# Patient Record
Sex: FEMALE | Race: WHITE | NOT HISPANIC OR LATINO | URBAN - METROPOLITAN AREA
[De-identification: names, ages, dates, MRNs, and addresses within clinical notes are randomized per-mention and may not be internally consistent; named-entity substitution may affect disease eponyms.]

---

## 2017-08-10 ENCOUNTER — OFFICE VISIT (OUTPATIENT)
Dept: MEDICAL GROUP | Facility: PHYSICIAN GROUP | Age: 72
End: 2017-08-10
Payer: MEDICARE

## 2017-08-10 VITALS
HEART RATE: 79 BPM | SYSTOLIC BLOOD PRESSURE: 115 MMHG | DIASTOLIC BLOOD PRESSURE: 70 MMHG | RESPIRATION RATE: 16 BRPM | TEMPERATURE: 97.5 F | OXYGEN SATURATION: 99 %

## 2017-08-10 DIAGNOSIS — Z99.3 WHEELCHAIR BOUND: ICD-10-CM

## 2017-08-10 DIAGNOSIS — I10 BENIGN ESSENTIAL HTN: ICD-10-CM

## 2017-08-10 DIAGNOSIS — Z78.0 POST-MENOPAUSAL: ICD-10-CM

## 2017-08-10 DIAGNOSIS — Z00.00 WELL ADULT EXAM: ICD-10-CM

## 2017-08-10 DIAGNOSIS — F41.9 ANXIETY: ICD-10-CM

## 2017-08-10 DIAGNOSIS — I89.0 LYMPHEDEMA: ICD-10-CM

## 2017-08-10 DIAGNOSIS — E78.2 MIXED HYPERLIPIDEMIA: ICD-10-CM

## 2017-08-10 DIAGNOSIS — M19.90 OSTEOARTHRITIS, UNSPECIFIED OSTEOARTHRITIS TYPE, UNSPECIFIED SITE: ICD-10-CM

## 2017-08-10 DIAGNOSIS — Z12.31 ENCOUNTER FOR SCREENING MAMMOGRAM FOR HIGH-RISK PATIENT: ICD-10-CM

## 2017-08-10 DIAGNOSIS — F11.90 OPIATE USE: ICD-10-CM

## 2017-08-10 PROCEDURE — 99204 OFFICE O/P NEW MOD 45 MIN: CPT | Performed by: FAMILY MEDICINE

## 2017-08-10 RX ORDER — FUROSEMIDE 20 MG/1
20 TABLET ORAL DAILY
Qty: 30 TAB | Refills: 3 | Status: SHIPPED | OUTPATIENT
Start: 2017-08-10 | End: 2017-12-10 | Stop reason: SDUPTHER

## 2017-08-10 RX ORDER — FLUCONAZOLE 100 MG/1
100 TABLET ORAL DAILY
COMMUNITY

## 2017-08-10 RX ORDER — BUSPIRONE HYDROCHLORIDE 15 MG/1
15 TABLET ORAL 2 TIMES DAILY
COMMUNITY

## 2017-08-10 RX ORDER — METOPROLOL SUCCINATE 25 MG/1
25 TABLET, EXTENDED RELEASE ORAL DAILY
COMMUNITY
End: 2017-09-08 | Stop reason: SDUPTHER

## 2017-08-10 RX ORDER — AMLODIPINE BESYLATE 5 MG/1
5 TABLET ORAL DAILY
COMMUNITY
End: 2017-09-08 | Stop reason: SDUPTHER

## 2017-08-10 RX ORDER — PAROXETINE HYDROCHLORIDE 20 MG/1
20 TABLET, FILM COATED ORAL DAILY
COMMUNITY
End: 2019-11-11 | Stop reason: SDUPTHER

## 2017-08-10 RX ORDER — MIRTAZAPINE 30 MG/1
30 TABLET, FILM COATED ORAL NIGHTLY
COMMUNITY

## 2017-08-10 RX ORDER — ALPRAZOLAM 0.5 MG/1
0.5 TABLET ORAL NIGHTLY PRN
COMMUNITY

## 2017-08-10 RX ORDER — HYDRALAZINE HYDROCHLORIDE 25 MG/1
25 TABLET, FILM COATED ORAL 3 TIMES DAILY
COMMUNITY

## 2017-08-10 ASSESSMENT — ENCOUNTER SYMPTOMS
DIZZINESS: 0
HEADACHES: 0
PALPITATIONS: 0
FEVER: 0
PSYCHIATRIC NEGATIVE: 1
ANOREXIA: 0
COUGH: 0
NEUROLOGICAL NEGATIVE: 1
RESPIRATORY NEGATIVE: 1
NECK PAIN: 0
MYALGIAS: 1
ABDOMINAL PAIN: 0
CHILLS: 0
LEG SWELLING: 1
ARTHRALGIAS: 1
EYES NEGATIVE: 1
GASTROINTESTINAL NEGATIVE: 1
CONSTITUTIONAL NEGATIVE: 1
CONSTIPATION: 0
BACK PAIN: 1
HEMOPTYSIS: 0

## 2017-08-10 ASSESSMENT — PATIENT HEALTH QUESTIONNAIRE - PHQ9
5. POOR APPETITE OR OVEREATING: 1 - SEVERAL DAYS
CLINICAL INTERPRETATION OF PHQ2 SCORE: 1
SUM OF ALL RESPONSES TO PHQ QUESTIONS 1-9: 6

## 2017-08-10 NOTE — MR AVS SNAPSHOT
Claudia Brumfield   8/10/2017 2:00 PM   Office Visit   MRN: [id number]    [facility]  [facility]   Dept Phone:  [phone]    Description:  Female : 1945   Provider:  Vinayak Forman M.D.           Reason for Visit     Establish Care     Referral Needed           Allergies as of 8/10/2017     No Known Allergies      You were diagnosed with     Lymphedema   [909491]       Well adult exam   [506342]       Osteoarthritis, unspecified osteoarthritis type, unspecified site   [8755912]       Wheelchair bound   [781170]       Benign essential HTN   [533993]       Mixed hyperlipidemia   [272.2.ICD-9-CM]       Encounter for screening mammogram for high-risk patient   [0947031]       Post-menopausal   [087548]       Opiate use   [711033]       Anxiety   [735571]         Vital Signs     Blood Pressure Pulse Temperature Respirations          115/70 mmHg 79 36.4 °C (97.5 °F) 16      Oxygen Saturation Smoking Status                99% Current Every Day Smoker          Basic Information     Date Of Birth Sex Race Ethnicity Preferred Language    1945 Female White Non- English      Your appointments     Sep 12, 2017  1:30 PM   Established Patient with Vinayak Forman M.D.   [facility] (--)    [address] [id number] [phone]           You will be receiving a confirmation call a few days before your appointment from our automated call confirmation system.              Problem List              ICD-10-CM Priority Class Noted - Resolved    Osteoarthritis M19.90   Unknown - Present    Wheelchair bound Z99.3   Unknown - Present    Lymphedema I89.0   8/10/2017 - Present    Benign essential HTN I10   8/10/2017 - Present    Mixed hyperlipidemia E78.2   8/10/2017 - Present    Post-menopausal Z78.0   8/10/2017 - Present    Opiate use F11.90   8/10/2017 - Present    Anxiety F41.9   8/10/2017 - Present      Health Maintenance        Date Due  Completion Dates    IMM DTaP/Tdap/Td Vaccine (1 - Tdap) 12/9/1965 ---    PAP SMEAR 12/9/1967 ---    MAMMOGRAM 12/9/1986 ---    COLONOSCOPY 12/9/1996 ---    IMM ZOSTER VACCINE 12/9/2006 ---    BONE DENSITY 12/9/2011 ---    IMM PNEUMOCOCCAL 65+ (ADULT) LOW/MEDIUM RISK SERIES (1 of 2 - PCV13) 12/9/2011 ---    IMM INFLUENZA (1) 9/1/2017 ---            Current Immunizations     No immunizations on file.      Below and/or attached are the medications your provider expects you to take. Review all of your home medications and newly ordered medications with your provider and/or pharmacist. Follow medication instructions as directed by your provider and/or pharmacist. Please keep your medication list with you and share with your provider. Update the information when medications are discontinued, doses are changed, or new medications (including over-the-counter products) are added; and carry medication information at all times in the event of emergency situations     Allergies:  No Known Allergies          Medications  Valid as of: August 10, 2017 -  5:56 PM    Generic Name Brand Name Tablet Size Instructions for use    ALPRAZolam (Tab) XANAX 0.5 MG Take 0.5 mg by mouth at bedtime as needed for Sleep.        AmLODIPine Besylate (Tab) NORVASC 5 MG Take 5 mg by mouth every day.        BuPROPion HCl (Tab) WELLBUTRIN 100 MG Take 100 mg by mouth 2 times a day.        BusPIRone HCl (Tab) BUSPAR 15 MG Take 15 mg by mouth 2 times a day.        DiazePAM (Tab) VALIUM 5 MG Take 5 mg by mouth every 6 hours as needed for Anxiety.        Fluconazole (Tab) DIFLUCAN 100 MG Take 100 mg by mouth every day.        Furosemide (Tab) LASIX 20 MG Take 1 Tab by mouth every day.        HydrALAZINE HCl (Tab) APRESOLINE 25 MG Take 25 mg by mouth 3 times a day.        Hydrocodone-Acetaminophen (Tab) NORCO  MG Take 1-2 Tabs by mouth every 6 hours as needed.        Meloxicam (Tab) MOBIC 15 MG Take 15 mg by mouth every day.        Metoprolol Succinate  (TABLET SR 24 HR) TOPROL XL 25 MG Take 25 mg by mouth every day.        Mirtazapine (Tab) REMERON 30 MG Take 30 mg by mouth every evening.        Morphine Sulfate (Tab) MS IR 30 MG Take 30 mg by mouth every four hours as needed for Mild Pain.        PARoxetine HCl (Tab) PAXIL 20 MG Take 20 mg by mouth every day.        Pravastatin Sodium (Tab) PRAVACHOL 20 MG Take 20 mg by mouth every evening.        Promethazine HCl (Tab) PHENERGAN 25 MG Take 25 mg by mouth every 6 hours as needed for Nausea/Vomiting.        Sertraline HCl (Tab) ZOLOFT 100 MG Take 100 mg by mouth every day.        SUMAtriptan Succinate (Tab) IMITREX 25 MG Take  mg by mouth Once PRN.        Tolterodine Tartrate (CAPSULE SR 24 HR) DETROL LA 4 MG Take 4 mg by mouth every day.        .                 Medicines prescribed today were sent to:     [facility] [address] [place] [address] [place]    [address] [place] [id number]    Phone: [phone] Fax: [phone]    Open 24 Hours?: No      Medication refill instructions:       If your prescription bottle indicates you have medication refills left, it is not necessary to call your provider’s office. Please contact your pharmacy and they will refill your medication.    If your prescription bottle indicates you do not have any refills left, you may request refills at any time through one of the following ways: The online [employer]al[employer] system (except Urgent Care), by calling your provider’s office, or by asking your pharmacy to contact your provider’s office with a refill request. Medication refills are processed only during regular business hours and may not be available until the next business day. Your provider may request additional information or to have a follow-up visit with you prior to refilling your medication.   *Please Note: Medication refills are assigned a new Rx number when refilled electronically. Your pharmacy may indicate that  no refills were authorized even though a new prescription for the same medication is available at the pharmacy. Please request the medicine by name with the pharmacy before contacting your provider for a refill.        Your To Do List     Future Labs/Procedures Complete By Expires    CBC WITHOUT DIFFERENTIAL  As directed 2/10/2018    COMP METABOLIC PANEL  As directed 8/10/2018    FREE THYROXINE  As directed 8/10/2018    LIPID PROFILE  As directed 8/10/2018    MA-SCREENING DIGITAL MAMMO  As directed 8/10/2018    OCCULT BLOOD FECES IMMUNOASSAY  As directed 8/10/2018    TRIIDOTHYRONINE  As directed 8/10/2018    TSH  As directed 8/10/2018    VITAMIN D,25 HYDROXY  As directed 8/10/2018      Referral     A referral request has been sent to our patient care coordination department. Please allow 3-5 business days for us to process this request and contact you either by phone or mail. If you do not hear from us by the 5th business day, please call us at [phone].           Sendmail Access Code: [email or web address]  Expires: 9/9/2017  5:56 PM    Your email address is not on file at [facility].  Email Addresses are required for you to sign up for Sendmail, please contact [phone] to verify your personal information and to provide your email address prior to attempting to register for Sendmail.    Claudia Brumfield  [address] [place] [address]    Sendmail  A secure, online tool to manage your health information     [facility]’s Sendmail® is a secure, online tool that connects you to your personalized health information from the privacy of your home -- day or night - making it very easy for you to manage your healthcare. Once the activation process is completed, you can even access your medical information using the Sendmail martha, which is available for free in the Apple Martha store or Google Play store.     To learn more about Sendmail, visit [email or web address]    There are two levels of access available (as  shown below):   My Chart Features  [facility] Doctor [facility]  Urgent  Care Non-[facility] Primary Care Doctor   Email your healthcare team securely and privately 24/7 X X X    Manage appointments: schedule your next appointment; view details of past/upcoming appointments X      Request prescription refills. X      View recent personal medical records, including lab and immunizations X X X X   View health record, including health history, allergies, medications X X X X   Read reports about your outpatient visits, procedures, consult and ER notes X X X X   See your discharge summary, which is a recap of your hospital and/or ER visit that includes your diagnosis, lab results, and care plan X X  X     How to register for NGenTec:  Once your e-mail address has been verified, follow the following steps to sign up for NGenTec.     1. Go to  [email or web address]  2. Click on the Sign Up Now box, which takes you to the New Member Sign Up page. You will need to provide the following information:  a. Enter your NGenTec Access Code exactly as it appears at the top of this page. (You will not need to use this code after you’ve completed the sign-up process. If you do not sign up before the expiration date, you must request a new code.)   b. Enter your date of birth.   c. Enter your home email address.   d. Click Submit, and follow the next screen’s instructions.  3. Create a NGenTec ID. This will be your NGenTec login ID and cannot be changed, so think of one that is secure and easy to remember.  4. Create a NGenTec password. You can change your password at any time.  5. Enter your Password Reset Question and Answer. This can be used at a later time if you forget your password.   6. Enter your e-mail address. This allows you to receive e-mail notifications when new information is available in NGenTec.  7. Click Sign Up. You can now view your health information.    For assistance activating your  [facility] account, call [phone]         Quit Tobacco Information     Do you want to quit using tobacco?    Quitting tobacco decreases risks of cancer, heart and lung disease, increases life expectancy, improves sense of taste and smell, and increases spending money, among other benefits.    If you are thinking about quitting, we can help.  • [facility] Quit Tobacco Program: [phone]  o Program occurs weekly for four weeks and includes pharmacist consultation on products to support quitting smoking or chewing tobacco. A provider referral is needed for pharmacist consultation.  • Tobacco Users Help Hotline: 3-800-QUIT-NOW (471-9469) or https://[place].quitlogix.org/  o Free, confidential telephone and online coaching for [place] residents. Sessions are designed on a schedule that is convenient for you. Eligible clients receive free nicotine replacement therapy.  • Nationally: www.smokefree.gov  o Information and professional assistance to support both immediate and long-term needs as you become, and remain, a non-smoker. Smokefree.gov allows you to choose the help that best fits your needs.

## 2017-08-10 NOTE — Clinical Note
"August 10, 2017        Claudia Brumfield,     Welcome to [employer].     You can send messages, schedule appointments, and request medication refills by sending your healthcare provider a \"Non-Urgent Medical Question.\" To login you may go to [employer]  or you on your Smartphone by downloading the [employer] martha (available in the Apple Martha store (iKnowl) or Google Play store).    Username: [email or web address]        Password: [email or web address]    SECURITY QUESTION   Randy    If you have any questions/concerns please do not hesitate to contact me at your earliest convenience @ [phone].     Eloy Roberts, Med Ass't                          Eloy Roberts    "

## 2017-08-10 NOTE — PROGRESS NOTES
Subjective:      Claudia Brumfield is a 71 y.o. female who presents with Establish Care and Referral Needed            HPI Comments: Patient is a 70 y/o who is wc bound due to OA and has a long history of chronic lymphedema in both legs  Was getting wound care and wrapping from  and needs a new referral for this  Will also start on low dose lasix and check labs and HM  1. Lymphedema    - hydrALAZINE (APRESOLINE) 25 MG Tab; Take 25 mg by mouth 3 times a day.  - alprazolam (XANAX) 0.5 MG Tab; Take 0.5 mg by mouth at bedtime as needed for Sleep.  - busPIRone (BUSPAR) 15 MG tablet; Take 15 mg by mouth 2 times a day.  - amlodipine (NORVASC) 5 MG Tab; Take 5 mg by mouth every day.  - metoprolol SR (TOPROL XL) 25 MG TABLET SR 24 HR; Take 25 mg by mouth every day.  - paroxetine (PAXIL) 20 MG Tab; Take 20 mg by mouth every day.  - mirtazapine (REMERON) 30 MG Tab tablet; Take 30 mg by mouth every evening.   - fluconazole (DIFLUCAN) 100 MG Tab; Take 100 mg by mouth every day.  - REFERRAL TO HOME HEALTH    2. Well adult exam    - OCCULT BLOOD FECES IMMUNOASSAY; Future    3. Osteoarthritis, unspecified osteoarthritis type, unspecified site  Per pain clinic for meds    4. Wheelchair bound      5. Benign essential HTN  Currently treated for HTN, taking meds with no CP or sob, monitors bp at home periodically. controlled        6. Mixed hyperlipidemia  Currently treated for HLD, taking meds with no new myalgias or joint pain, watching fats in diet  controlled        7. Encounter for screening mammogram for high-risk patient    - MA-SCREENING DIGITAL MAMMO; Future    8. Post-menopausal    - DS-BONE DENSITY STUDY (DEXA)    Past Medical History:    Fibromyalgia                                                  Migraine                                                      DJD (degenerative joint disease)                              Chronic fatigue                                               Lymphedema                                                     Osteoarthritis                                                  Comment:wc bound due to pain in back and hips    Wheelchair bound                                              Hyperlipidemia                                                Hypertension                                                  Encounter for long-term (current) use of other*                 Comment:surya    Depression                                                    Anxiety                                                     Past Surgical History:    OTHER ORTHOPEDIC SURGERY                                         Comment:neck    OTHER ABDOMINAL SURGERY                                        CHOLECYSTECTOMY                                                COLON RESECTION                                                TONSILLECTOMY                                                  HERNIA REPAIR                                                  Smoking Status: Current Every Day Smoker        Packs/Day: 0.50  Years:          Alcohol Use: No              History reviewed.  No pertinent family history.      Current outpatient prescriptions: •  hydrALAZINE (APRESOLINE) 25 MG Tab, Take 25 mg by mouth 3 times a day., Disp: , Rfl: •  alprazolam (XANAX) 0.5 MG Tab, Take 0.5 mg by mouth at bedtime as needed for Sleep., Disp: , Rfl: •  busPIRone (BUSPAR) 15 MG tablet, Take 15 mg by mouth 2 times a day., Disp: , Rfl: •  amlodipine (NORVASC) 5 MG Tab, Take 5 mg by mouth every day., Disp: , Rfl: •  metoprolol SR (TOPROL XL) 25 MG TABLET SR 24 HR, Take 25 mg by mouth every day., Disp: , Rfl: •  paroxetine (PAXIL) 20 MG Tab, Take 20 mg by mouth every day., Disp: , Rfl: •  mirtazapine (REMERON) 30 MG Tab tablet, Take 30 mg by mouth every evening., Disp: , Rfl: •  fluconazole (DIFLUCAN) 100 MG Tab, Take 100 mg by mouth every day., Disp: , Rfl: •  hydrocodone/acetaminophen (NORCO)  MG TABS, Take 1-2 Tabs by mouth every 6 hours as needed.,  Disp: , Rfl: •  sumatriptan (IMITREX) 25 MG TABS, Take  mg by mouth Once PRN., Disp: , Rfl: •  MORPHINE SULFATE 30 MG PO TABS, Take 30 mg by mouth every four hours as needed for Mild Pain., Disp: , Rfl: •  MELOXICAM 15 MG PO TABS, Take 15 mg by mouth every day., Disp: , Rfl: •  buPROPion (WELLBUTRIN) 100 MG TABS, Take 100 mg by mouth 2 times a day., Disp: , Rfl: •  tolterodine ER (DETROL LA) 4 MG CP24, Take 4 mg by mouth every day., Disp: , Rfl: •  pravastatin (PRAVACHOL) 20 MG TABS, Take 20 mg by mouth every evening., Disp: , Rfl: •  PROMETHAZINE HCL 25 MG PO TABS, Take 25 mg by mouth every 6 hours as needed for Nausea/Vomiting., Disp: , Rfl: •  DIAZEPAM 5 MG PO TABS, Take 5 mg by mouth every 6 hours as needed for Anxiety., Disp: , Rfl: •  SERTRALINE  MG PO TABS, Take 100 mg by mouth every day., Disp: , Rfl:         Leg Swelling  This is a chronic problem. The current episode started more than 1 year ago. The problem occurs constantly. The problem has been gradually worsening. Associated symptoms include arthralgias and myalgias. Pertinent negatives include no abdominal pain, anorexia, chest pain, chills, coughing, fever, headaches, neck pain or rash. She has tried rest for the symptoms. The treatment provided no relief.       Review of Systems   Constitutional: Negative.  Negative for fever and chills.        Past Medical History:    Fibromyalgia                                                  Migraine                                                      DJD (degenerative joint disease)                              Chronic fatigue                                               Lymphedema                                                    Osteoarthritis                                                  Comment:wc bound due to pain in back and hips    Wheelchair bound                                              Hyperlipidemia                                                Hypertension                                                   Encounter for long-term (current) use of other*                 Comment:surya    Depression                                                    Anxiety                                                     Past Surgical History:    OTHER ORTHOPEDIC SURGERY                                         Comment:neck    OTHER ABDOMINAL SURGERY                                        CHOLECYSTECTOMY                                                COLON RESECTION                                                TONSILLECTOMY                                                  HERNIA REPAIR                                                  Smoking Status: Current Every Day Smoker        Packs/Day: 0.50  Years:         Alcohol Use: No              History reviewed.  No pertinent family history.     HENT: Negative.    Eyes: Negative.    Respiratory: Negative.  Negative for cough and hemoptysis.    Cardiovascular: Positive for leg swelling. Negative for chest pain and palpitations.   Gastrointestinal: Negative.  Negative for abdominal pain, constipation and anorexia.   Genitourinary: Negative.  Negative for dysuria and urgency.   Musculoskeletal: Positive for myalgias, back pain, joint pain and arthralgias. Negative for neck pain.   Skin: Negative.  Negative for rash.   Neurological: Negative.  Negative for dizziness and headaches.   Endo/Heme/Allergies: Negative.    Psychiatric/Behavioral: Negative.  Negative for suicidal ideas.          Objective:     /70 mmHg  Pulse 79  Temp(Src) 36.4 °C (97.5 °F)  Resp 16  Ht   Wt   SpO2 99%     Physical Exam   Constitutional: She is oriented to person, place, and time. No distress.   HENT:   Head: Normocephalic and atraumatic.   Right Ear: External ear normal.   Left Ear: External ear normal.   Nose: Nose normal.   Mouth/Throat: Oropharynx is clear and moist. No oropharyngeal exudate.   Eyes: Pupils are equal, round, and reactive to light. Right eye exhibits  no discharge. Left eye exhibits no discharge. No scleral icterus.   Neck: Normal range of motion. Neck supple. No JVD present. No tracheal deviation present. No thyromegaly present.   Cardiovascular: Normal rate, regular rhythm, normal heart sounds and intact distal pulses.  Exam reveals no gallop and no friction rub.    No murmur heard.  Pulmonary/Chest: Effort normal and breath sounds normal. No stridor. No respiratory distress. She has no wheezes. She has no rales. She exhibits no tenderness.   Abdominal: Soft. She exhibits no distension. There is no tenderness.   Lymphadenopathy:     She has no cervical adenopathy.   Neurological: She is alert and oriented to person, place, and time.   Skin: Skin is warm and dry. She is not diaphoretic.   Chronic lymphedema in both legs with venous stasis  Swelling in both sides with brawny edema and some clear leakage at spots but no induration or obvious current infection  Will treat with venous stasis wraps   Psychiatric: Judgment normal.   Nursing note and vitals reviewed.              Assessment/Plan:     1. Lymphedema    - hydrALAZINE (APRESOLINE) 25 MG Tab; Take 25 mg by mouth 3 times a day.  - alprazolam (XANAX) 0.5 MG Tab; Take 0.5 mg by mouth at bedtime as needed for Sleep.  - busPIRone (BUSPAR) 15 MG tablet; Take 15 mg by mouth 2 times a day.  - amlodipine (NORVASC) 5 MG Tab; Take 5 mg by mouth every day.  - metoprolol SR (TOPROL XL) 25 MG TABLET SR 24 HR; Take 25 mg by mouth every day.  - paroxetine (PAXIL) 20 MG Tab; Take 20 mg by mouth every day.  - mirtazapine (REMERON) 30 MG Tab tablet; Take 30 mg by mouth every evening.  - fluconazole (DIFLUCAN) 100 MG Tab; Take 100 mg by mouth every day.  - REFERRAL TO HOME HEALTH    2. Well adult exam    - OCCULT BLOOD FECES IMMUNOASSAY; Future    3. Osteoarthritis, unspecified osteoarthritis type, unspecified site      4. Wheelchair bound      5. Benign essential HTN      6. Mixed hyperlipidemia      7. Encounter for  screening mammogram for high-risk patient    - MA-SCREENING DIGITAL MAMMO; Future    8. Post-menopausal    - DS-BONE DENSITY STUDY (DEXA)

## 2017-08-22 ENCOUNTER — TELEPHONE (OUTPATIENT)
Dept: MEDICAL GROUP | Facility: PHYSICIAN GROUP | Age: 72
End: 2017-08-22

## 2017-08-22 NOTE — TELEPHONE ENCOUNTER
Patent just stared taking lasix and she is swelling a lot more and not urinating as much as normal.

## 2017-08-23 NOTE — TELEPHONE ENCOUNTER
Tried to call patient but her number was disconnected so i tried calling her emergency contact Brenda and left a message with her too call us back or too have the patient call us back.

## 2017-08-25 DIAGNOSIS — E87.6 HYPOKALEMIA: ICD-10-CM

## 2017-08-28 RX ORDER — POTASSIUM CHLORIDE 750 MG/1
10 TABLET, FILM COATED, EXTENDED RELEASE ORAL 2 TIMES DAILY
Qty: 60 TAB | Refills: 3 | Status: SHIPPED | OUTPATIENT
Start: 2017-08-28 | End: 2017-12-26 | Stop reason: SDUPTHER

## 2017-08-29 ENCOUNTER — TELEPHONE (OUTPATIENT)
Dept: MEDICAL GROUP | Facility: PHYSICIAN GROUP | Age: 72
End: 2017-08-29

## 2017-08-29 NOTE — TELEPHONE ENCOUNTER
----- Message from Vinayak Forman M.D. sent at 8/28/2017  9:42 AM PDT -----  Low on potassium, a rx for this was called in for her and then needs to repeat this lab one week before we see her in september

## 2017-08-29 NOTE — TELEPHONE ENCOUNTER
Tried to call patient but phone was disconnected. No other number listed in patients chart. Patient comes in next month so we can change  the numbers then and giver her the results.

## 2017-08-31 ENCOUNTER — TELEPHONE (OUTPATIENT)
Dept: MEDICAL GROUP | Facility: PHYSICIAN GROUP | Age: 72
End: 2017-08-31

## 2017-08-31 NOTE — TELEPHONE ENCOUNTER
Phone Number Called: [phone]    Message: LFT Vm FOR PT TO CALL US BACK SHE NEEDS TO RE DO URINE TEST     Left Message for patient to call back: no

## 2017-08-31 NOTE — TELEPHONE ENCOUNTER
----- Message from Vinayak Forman M.D. sent at 8/31/2017  2:53 PM PDT -----  Urine test was contaminated, please have come into the office for a visit and repeat

## 2017-09-07 ENCOUNTER — TELEPHONE (OUTPATIENT)
Dept: MEDICAL GROUP | Facility: PHYSICIAN GROUP | Age: 72
End: 2017-09-07

## 2017-09-07 NOTE — TELEPHONE ENCOUNTER
Patients home health nurse would like to get  A  culture order. She has burning and frequent urination since her hospital visit and she will be coming in to [place] on 9/12.

## 2017-09-08 DIAGNOSIS — Z78.0 POST-MENOPAUSAL: ICD-10-CM

## 2017-09-08 DIAGNOSIS — I89.0 LYMPHEDEMA: ICD-10-CM

## 2017-09-08 DIAGNOSIS — M19.90 OSTEOARTHRITIS, UNSPECIFIED OSTEOARTHRITIS TYPE, UNSPECIFIED SITE: ICD-10-CM

## 2017-09-08 DIAGNOSIS — E78.2 MIXED HYPERLIPIDEMIA: ICD-10-CM

## 2017-09-08 DIAGNOSIS — Z99.3 WHEELCHAIR BOUND: ICD-10-CM

## 2017-09-08 DIAGNOSIS — Z00.00 WELL ADULT EXAM: ICD-10-CM

## 2017-09-08 DIAGNOSIS — Z12.31 ENCOUNTER FOR SCREENING MAMMOGRAM FOR HIGH-RISK PATIENT: ICD-10-CM

## 2017-09-08 DIAGNOSIS — I10 BENIGN ESSENTIAL HTN: ICD-10-CM

## 2017-09-08 NOTE — TELEPHONE ENCOUNTER
Called and left a message with Yadira at [facility] informed her of message below.

## 2017-09-08 NOTE — TELEPHONE ENCOUNTER
Was the patient seen in the last year in this department? Yes     Does patient have an active prescription for medications requested? Yes     Received Request Via: Patient     Last office visit 08/10/17

## 2017-09-11 RX ORDER — METOPROLOL SUCCINATE 25 MG/1
25 TABLET, EXTENDED RELEASE ORAL DAILY
Qty: 30 TAB | Refills: 0 | Status: SHIPPED | OUTPATIENT
Start: 2017-09-11 | End: 2017-10-09 | Stop reason: SDUPTHER

## 2017-09-11 RX ORDER — AMLODIPINE BESYLATE 5 MG/1
5 TABLET ORAL DAILY
Qty: 30 TAB | Refills: 0 | Status: SHIPPED | OUTPATIENT
Start: 2017-09-11 | End: 2017-09-12 | Stop reason: SDUPTHER

## 2017-09-12 ENCOUNTER — OFFICE VISIT (OUTPATIENT)
Dept: MEDICAL GROUP | Facility: PHYSICIAN GROUP | Age: 72
End: 2017-09-12
Payer: MEDICARE

## 2017-09-12 VITALS
TEMPERATURE: 97.9 F | HEART RATE: 107 BPM | SYSTOLIC BLOOD PRESSURE: 130 MMHG | OXYGEN SATURATION: 96 % | DIASTOLIC BLOOD PRESSURE: 80 MMHG | RESPIRATION RATE: 16 BRPM

## 2017-09-12 DIAGNOSIS — F11.90 OPIATE USE: ICD-10-CM

## 2017-09-12 DIAGNOSIS — M19.90 OSTEOARTHRITIS, UNSPECIFIED OSTEOARTHRITIS TYPE, UNSPECIFIED SITE: ICD-10-CM

## 2017-09-12 DIAGNOSIS — Z12.31 ENCOUNTER FOR SCREENING MAMMOGRAM FOR HIGH-RISK PATIENT: ICD-10-CM

## 2017-09-12 DIAGNOSIS — I10 BENIGN ESSENTIAL HTN: ICD-10-CM

## 2017-09-12 DIAGNOSIS — Z00.00 WELL ADULT EXAM: ICD-10-CM

## 2017-09-12 DIAGNOSIS — E78.2 MIXED HYPERLIPIDEMIA: ICD-10-CM

## 2017-09-12 DIAGNOSIS — N30.01 ACUTE CYSTITIS WITH HEMATURIA: ICD-10-CM

## 2017-09-12 DIAGNOSIS — I89.0 LYMPHEDEMA: ICD-10-CM

## 2017-09-12 DIAGNOSIS — Z78.0 POST-MENOPAUSAL: ICD-10-CM

## 2017-09-12 DIAGNOSIS — I87.8 VENOUS STASIS: ICD-10-CM

## 2017-09-12 DIAGNOSIS — Z99.3 WHEELCHAIR BOUND: ICD-10-CM

## 2017-09-12 PROCEDURE — 99214 OFFICE O/P EST MOD 30 MIN: CPT | Performed by: FAMILY MEDICINE

## 2017-09-12 RX ORDER — SULFAMETHOXAZOLE AND TRIMETHOPRIM 800; 160 MG/1; MG/1
1 TABLET ORAL 2 TIMES DAILY
Qty: 20 TAB | Refills: 0 | Status: SHIPPED | OUTPATIENT
Start: 2017-09-12

## 2017-09-12 RX ORDER — AMLODIPINE BESYLATE 5 MG/1
5 TABLET ORAL DAILY
Qty: 30 TAB | Refills: 6 | Status: SHIPPED | OUTPATIENT
Start: 2017-09-12 | End: 2020-10-16 | Stop reason: SDUPTHER

## 2017-09-12 ASSESSMENT — ENCOUNTER SYMPTOMS
CONSTITUTIONAL NEGATIVE: 1
CHILLS: 0
PALPITATIONS: 0
NECK PAIN: 1
EYES NEGATIVE: 1
GASTROINTESTINAL NEGATIVE: 1
PSYCHIATRIC NEGATIVE: 1
DIZZINESS: 0
COUGH: 0
CONSTIPATION: 0
NEUROLOGICAL NEGATIVE: 1
HEMOPTYSIS: 0
HEADACHES: 0
MYALGIAS: 1
BACK PAIN: 1
RESPIRATORY NEGATIVE: 1
CARDIOVASCULAR NEGATIVE: 1
FEVER: 0

## 2017-09-12 NOTE — PROGRESS NOTES
Subjective:      Claudia Brumfield is a 71 y.o. female who presents with UTI            1. Acute cystitis with hematuria  New onset of frequency and dysuria    - sulfamethoxazole-trimethoprim (BACTRIM DS) 800-160 MG tablet; Take 1 Tab by mouth 2 times a day.  Dispense: 20 Tab; Refill: 0    2. Wheelchair bound  Had recent fall at home and resolving left forehead ecchymosis, went to er and neg ct of head and face, had life alert now    3. Opiate use  Per pain clinic    4. Venous stasis  Using compression wraps per wound clinic    Past Medical History:  No date: Anxiety  No date: Chronic fatigue  No date: Depression  No date: DJD (degenerative joint disease)  No date: Encounter for long-term (current) use of other*      Comment: surya  No date: Fibromyalgia  No date: Hyperlipidemia  No date: Hypertension  No date: Lymphedema  No date: Migraine  No date: Osteoarthritis      Comment: wc bound due to pain in back and hips  No date: Wheelchair bound  Past Surgical History:  No date: CHOLECYSTECTOMY  No date: COLON RESECTION  No date: HERNIA REPAIR  No date: OTHER ABDOMINAL SURGERY  No date: OTHER ORTHOPEDIC SURGERY      Comment: neck  No date: TONSILLECTOMY  Smoking status: Current Every Day Smoker                                                   Packs/day: 0.50      Years: 0.00      Smokeless tobacco: Never Used                      Alcohol use: No              No family history on file.      Current Outpatient Prescriptions: •  sulfamethoxazole-trimethoprim (BACTRIM DS) 800-160 MG tablet, Take 1 Tab by mouth 2 times a day., Disp: 20 Tab, Rfl: 0•  amlodipine (NORVASC) 5 MG Tab, Take 1 Tab by mouth every day., Disp: 30 Tab, Rfl: 0•  metoprolol SR (TOPROL XL) 25 MG TABLET SR 24 HR, Take 1 Tab by mouth every day., Disp: 30 Tab, Rfl: 0•  potassium chloride ER (KLOR-CON) 10 MEQ tablet, Take 1 Tab by mouth 2 times a day., Disp: 60 Tab, Rfl: 3•  hydrALAZINE (APRESOLINE) 25 MG Tab, Take 25 mg by mouth 3 times a day., Disp: , Rfl:  •  alprazolam (XANAX) 0.5 MG Tab, Take 0.5 mg by mouth at bedtime as needed for Sleep., Disp: , Rfl: •  busPIRone (BUSPAR) 15 MG tablet, Take 15 mg by mouth 2 times a day., Disp: , Rfl: •  paroxetine (PAXIL) 20 MG Tab, Take 20 mg by mouth every day., Disp: , Rfl: •  mirtazapine (REMERON) 30 MG Tab tablet, Take 30 mg by mouth every evening., Disp: , Rfl: •  fluconazole (DIFLUCAN) 100 MG Tab, Take 100 mg by mouth every day., Disp: , Rfl: •  furosemide (LASIX) 20 MG Tab, Take 1 Tab by mouth every day., Disp: 30 Tab, Rfl: 3•  buPROPion (WELLBUTRIN) 100 MG TABS, Take 100 mg by mouth 2 times a day., Disp: , Rfl: •  tolterodine ER (DETROL LA) 4 MG CP24, Take 4 mg by mouth every day., Disp: , Rfl: •  hydrocodone/acetaminophen (NORCO)  MG TABS, Take 1-2 Tabs by mouth every 6 hours as needed., Disp: , Rfl: •  sumatriptan (IMITREX) 25 MG TABS, Take  mg by mouth Once PRN., Disp: , Rfl: •  pravastatin (PRAVACHOL) 20 MG TABS, Take 20 mg by mouth every evening., Disp: , Rfl: •  MORPHINE SULFATE 30 MG PO TABS, Take 30 mg by mouth every four hours as needed for Mild Pain., Disp: , Rfl: •  MELOXICAM 15 MG PO TABS, Take 15 mg by mouth every day., Disp: , Rfl: •  PROMETHAZINE HCL 25 MG PO TABS, Take 25 mg by mouth every 6 hours as needed for Nausea/Vomiting., Disp: , Rfl: •  DIAZEPAM 5 MG PO TABS, Take 5 mg by mouth every 6 hours as needed for Anxiety., Disp: , Rfl: •  SERTRALINE  MG PO TABS, Take 100 mg by mouth every day., Disp: , Rfl:           Review of Systems   Constitutional: Negative.  Negative for chills and fever.        Past Medical History:  No date: Anxiety  No date: Chronic fatigue  No date: Depression  No date: DJD (degenerative joint disease)  No date: Encounter for long-term (current) use of other*      Comment: surya  No date: Fibromyalgia  No date: Hyperlipidemia  No date: Hypertension  No date: Lymphedema  No date: Migraine  No date: Osteoarthritis      Comment: wc bound due to pain in back and  hips  No date: Wheelchair bound  Past Surgical History:  No date: CHOLECYSTECTOMY  No date: COLON RESECTION  No date: HERNIA REPAIR  No date: OTHER ABDOMINAL SURGERY  No date: OTHER ORTHOPEDIC SURGERY      Comment: neck  No date: TONSILLECTOMY  Smoking status: Current Every Day Smoker                                                   Packs/day: 0.50      Years: 0.00      Smokeless tobacco: Never Used                      Alcohol use: No              No family history on file.     HENT: Negative.    Eyes: Negative.    Respiratory: Negative.  Negative for cough and hemoptysis.    Cardiovascular: Negative.  Negative for chest pain and palpitations.   Gastrointestinal: Negative.  Negative for constipation.   Genitourinary: Positive for dysuria, frequency and urgency.   Musculoskeletal: Positive for back pain, joint pain, myalgias and neck pain.   Skin: Negative.  Negative for rash.   Neurological: Negative.  Negative for dizziness and headaches.   Endo/Heme/Allergies: Negative.    Psychiatric/Behavioral: Negative.  Negative for suicidal ideas.          Objective:     /80   Pulse (!) 107   Temp 36.6 °C (97.9 °F)   Resp 16   SpO2 96%      Physical Exam   Constitutional: She is oriented to person, place, and time. She appears well-developed and well-nourished. No distress.   HENT:   Head: Normocephalic and atraumatic.   Mouth/Throat: Oropharynx is clear and moist. No oropharyngeal exudate.   wc bound, legs with chronic venous stasis  Resolving ecchymosis on left forehead   Eyes: Pupils are equal, round, and reactive to light.   Cardiovascular: Normal rate, regular rhythm, normal heart sounds and intact distal pulses.  Exam reveals no gallop and no friction rub.    No murmur heard.  Pulmonary/Chest: Effort normal and breath sounds normal. No respiratory distress. She has no wheezes. She has no rales. She exhibits no tenderness.   Neurological: She is alert and oriented to person, place, and time.   Skin: She is  not diaphoretic.   Psychiatric: She has a normal mood and affect. Her behavior is normal. Judgment and thought content normal.   Nursing note and vitals reviewed.              Assessment/Plan:     1. Acute cystitis with hematuria    - sulfamethoxazole-trimethoprim (BACTRIM DS) 800-160 MG tablet; Take 1 Tab by mouth 2 times a day.  Dispense: 20 Tab; Refill: 0    2. Wheelchair bound      3. Opiate use      4. Venous stasis    Scheduled to do HM next month

## 2017-09-22 NOTE — TELEPHONE ENCOUNTER
Was the patient seen in the last year in this department? Yes     Does patient have an active prescription for medications requested? No     Received Request Via: Patient   Last seen 9/12/17  Last labs  8/22/17

## 2017-09-25 RX ORDER — SUMATRIPTAN 25 MG/1
TABLET, FILM COATED ORAL
Qty: 20 TAB | Refills: 1 | Status: SHIPPED | OUTPATIENT
Start: 2017-09-25 | End: 2017-10-09 | Stop reason: SDUPTHER

## 2017-10-09 DIAGNOSIS — Z78.0 POST-MENOPAUSAL: ICD-10-CM

## 2017-10-09 DIAGNOSIS — Z12.31 ENCOUNTER FOR SCREENING MAMMOGRAM FOR HIGH-RISK PATIENT: ICD-10-CM

## 2017-10-09 DIAGNOSIS — I10 BENIGN ESSENTIAL HTN: ICD-10-CM

## 2017-10-09 DIAGNOSIS — M19.90 OSTEOARTHRITIS, UNSPECIFIED OSTEOARTHRITIS TYPE, UNSPECIFIED SITE: ICD-10-CM

## 2017-10-09 DIAGNOSIS — Z00.00 WELL ADULT EXAM: ICD-10-CM

## 2017-10-09 DIAGNOSIS — E78.2 MIXED HYPERLIPIDEMIA: ICD-10-CM

## 2017-10-09 DIAGNOSIS — Z99.3 WHEELCHAIR BOUND: ICD-10-CM

## 2017-10-09 DIAGNOSIS — I89.0 LYMPHEDEMA: ICD-10-CM

## 2017-10-09 RX ORDER — AMLODIPINE BESYLATE 5 MG/1
TABLET ORAL
Qty: 90 TAB | Refills: 1 | Status: SHIPPED | OUTPATIENT
Start: 2017-10-09 | End: 2018-04-14 | Stop reason: SDUPTHER

## 2017-10-09 RX ORDER — SUMATRIPTAN 25 MG/1
TABLET, FILM COATED ORAL
Qty: 20 TAB | Refills: 1 | Status: SHIPPED | OUTPATIENT
Start: 2017-10-09 | End: 2018-05-22 | Stop reason: SDUPTHER

## 2017-10-09 RX ORDER — METOPROLOL SUCCINATE 25 MG/1
TABLET, EXTENDED RELEASE ORAL
Qty: 30 TAB | Refills: 0 | Status: SHIPPED | OUTPATIENT
Start: 2017-10-09 | End: 2017-11-06 | Stop reason: SDUPTHER

## 2017-10-09 NOTE — TELEPHONE ENCOUNTER
Was the patient seen in the last year in this department? Yes     Does patient have an active prescription for medications requested? Yes     Received Request Via: Patient     Last Visit: 9/12/17  Last Labs: 8/25/17

## 2017-10-09 NOTE — TELEPHONE ENCOUNTER
Was the patient seen in the last year in this department? Yes     Does patient have an active prescription for medications requested? No     Received Request Via: Pharmacy   Last seen  9/12/17  Last labs   8/31/17

## 2017-10-12 NOTE — TELEPHONE ENCOUNTER
Was the patient seen in the last year in this department? Yes     Does patient have an active prescription for medications requested? Yes     Received Request Via: Pharmacy         Last Visit: 9/12/17  Last Labs: 8/25/17

## 2017-11-06 DIAGNOSIS — Z99.3 WHEELCHAIR BOUND: ICD-10-CM

## 2017-11-06 DIAGNOSIS — Z00.00 WELL ADULT EXAM: ICD-10-CM

## 2017-11-06 DIAGNOSIS — I10 BENIGN ESSENTIAL HTN: ICD-10-CM

## 2017-11-06 DIAGNOSIS — Z12.31 ENCOUNTER FOR SCREENING MAMMOGRAM FOR HIGH-RISK PATIENT: ICD-10-CM

## 2017-11-06 DIAGNOSIS — I89.0 LYMPHEDEMA: ICD-10-CM

## 2017-11-06 DIAGNOSIS — Z78.0 POST-MENOPAUSAL: ICD-10-CM

## 2017-11-06 DIAGNOSIS — E78.2 MIXED HYPERLIPIDEMIA: ICD-10-CM

## 2017-11-06 DIAGNOSIS — M19.90 OSTEOARTHRITIS, UNSPECIFIED OSTEOARTHRITIS TYPE, UNSPECIFIED SITE: ICD-10-CM

## 2017-11-06 RX ORDER — METOPROLOL SUCCINATE 25 MG/1
TABLET, EXTENDED RELEASE ORAL
Qty: 90 TAB | Refills: 1 | Status: SHIPPED | OUTPATIENT
Start: 2017-11-06 | End: 2018-05-08 | Stop reason: SDUPTHER

## 2017-11-14 NOTE — TELEPHONE ENCOUNTER
Was the patient seen in the last year in this department? Yes     Does patient have an active prescription for medications requested? No     Received Request Via: Patient   Last seen  9/12/17  Last labs  8/25/17

## 2017-12-10 DIAGNOSIS — I89.0 LYMPHEDEMA: ICD-10-CM

## 2017-12-11 RX ORDER — FUROSEMIDE 20 MG/1
TABLET ORAL
Qty: 90 TAB | Refills: 0 | Status: SHIPPED | OUTPATIENT
Start: 2017-12-11 | End: 2018-03-15 | Stop reason: SDUPTHER

## 2017-12-11 NOTE — TELEPHONE ENCOUNTER
Was the patient seen in the last year in this department? Yes     Does patient have an active prescription for medications requested? No     Received Request Via: Pharmacy   Last seen  9/12/17  Last labs 8/25/17

## 2017-12-26 DIAGNOSIS — E87.6 HYPOKALEMIA: ICD-10-CM

## 2017-12-26 RX ORDER — POTASSIUM CHLORIDE 750 MG/1
10 TABLET, FILM COATED, EXTENDED RELEASE ORAL 2 TIMES DAILY
Qty: 60 TAB | Refills: 3 | Status: SHIPPED | OUTPATIENT
Start: 2017-12-26 | End: 2018-04-24 | Stop reason: SDUPTHER

## 2018-02-13 ENCOUNTER — OFFICE VISIT (OUTPATIENT)
Dept: MEDICAL GROUP | Facility: PHYSICIAN GROUP | Age: 73
End: 2018-02-13
Payer: MEDICARE

## 2018-02-13 VITALS
RESPIRATION RATE: 14 BRPM | DIASTOLIC BLOOD PRESSURE: 60 MMHG | SYSTOLIC BLOOD PRESSURE: 120 MMHG | OXYGEN SATURATION: 97 % | TEMPERATURE: 98.5 F | HEART RATE: 70 BPM

## 2018-02-13 DIAGNOSIS — Z99.3 WHEELCHAIR BOUND: ICD-10-CM

## 2018-02-13 DIAGNOSIS — M15.9 PRIMARY OSTEOARTHRITIS INVOLVING MULTIPLE JOINTS: ICD-10-CM

## 2018-02-13 DIAGNOSIS — F11.90 OPIATE USE: ICD-10-CM

## 2018-02-13 DIAGNOSIS — R53.83 FATIGUE, UNSPECIFIED TYPE: ICD-10-CM

## 2018-02-13 DIAGNOSIS — Z23 NEED FOR VACCINE FOR DT (DIPHTHERIA-TETANUS): ICD-10-CM

## 2018-02-13 DIAGNOSIS — I10 BENIGN ESSENTIAL HTN: ICD-10-CM

## 2018-02-13 PROCEDURE — 99214 OFFICE O/P EST MOD 30 MIN: CPT | Performed by: FAMILY MEDICINE

## 2018-02-13 RX ORDER — MORPHINE SULFATE 15 MG/1
TABLET, FILM COATED, EXTENDED RELEASE ORAL
Refills: 0 | COMMUNITY
Start: 2018-01-17

## 2018-02-13 RX ORDER — MORPHINE SULFATE 15 MG/1
TABLET ORAL
Refills: 0 | COMMUNITY
Start: 2018-01-17 | End: 2018-05-09

## 2018-02-13 RX ORDER — TRIAMTERENE AND HYDROCHLOROTHIAZIDE 37.5; 25 MG/1; MG/1
1 TABLET ORAL EVERY MORNING
COMMUNITY
End: 2018-02-13 | Stop reason: SDUPTHER

## 2018-02-13 ASSESSMENT — ENCOUNTER SYMPTOMS
EYES NEGATIVE: 1
PALPITATIONS: 0
CONSTIPATION: 0
MYALGIAS: 1
DIZZINESS: 0
FEVER: 0
RESPIRATORY NEGATIVE: 1
HEMOPTYSIS: 0
CARDIOVASCULAR NEGATIVE: 1
COUGH: 0
CHILLS: 0
NEUROLOGICAL NEGATIVE: 1
HEADACHES: 0
NECK PAIN: 0
PSYCHIATRIC NEGATIVE: 1
GASTROINTESTINAL NEGATIVE: 1
BACK PAIN: 1

## 2018-02-14 RX ORDER — TRIAMTERENE AND HYDROCHLOROTHIAZIDE 37.5; 25 MG/1; MG/1
1 TABLET ORAL EVERY MORNING
Qty: 30 TAB | Refills: 0 | Status: SHIPPED | OUTPATIENT
Start: 2018-02-14 | End: 2018-03-14 | Stop reason: SDUPTHER

## 2018-02-14 NOTE — PROGRESS NOTES
Subjective:      Claudia Brumfield is a 72 y.o. female who presents with Pain and Hepatitis C (labs ordered )            1. Primary osteoarthritis involving multiple joints  On pain meds from pain clinic  - morphine (MS IR) 15 MG tablet; TK 1 T PO  Q 6 H PRN FOR 30 DAYS; Refill: 0   - morphine ER (MS CONTIN) 15 MG Tab CR tablet; TK 1 T PO  Q 12 H FOR 30 DAYS; Refill: 0  - FREE THYROXINE; Future  - COMP METABOLIC PANEL; Future  - LIPID PROFILE; Future  - TRIIDOTHYRONINE; Future  - TSH; Future  - VITAMIN D,25 HYDROXY; Future  - CBC WITH DIFFERENTIAL; Future  - IRON/TOTAL IRON BIND; Future  - VITAMIN B12; Future  - FOLATE; Future    2. Benign essential HTN  Currently treated for HTN, taking meds with no CP or sob, monitors bp at home periodically. controlled    - morphine (MS IR) 15 MG tablet; TK 1 T PO  Q 6 H PRN FOR 30 DAYS; Refill: 0   - morphine ER (MS CONTIN) 15 MG Tab CR tablet; TK 1 T PO  Q 12 H FOR 30 DAYS; Refill: 0  - FREE THYROXINE; Future  - COMP METABOLIC PANEL; Future  - LIPID PROFILE; Future  - TRIIDOTHYRONINE; Future  - TSH; Future  - VITAMIN D,25 HYDROXY; Future  - CBC WITH DIFFERENTIAL; Future  - IRON/TOTAL IRON BIND; Future  - VITAMIN B12; Future  - FOLATE; Future    3. Opiate use  Per pain clinic  - morphine (MS IR) 15 MG tablet; TK 1 T PO  Q 6 H PRN FOR 30 DAYS; Refill: 0   - morphine ER (MS CONTIN) 15 MG Tab CR tablet; TK 1 T PO  Q 12 H FOR 30 DAYS; Refill: 0  - FREE THYROXINE; Future  - COMP METABOLIC PANEL; Future  - LIPID PROFILE; Future  - TRIIDOTHYRONINE; Future  - TSH; Future  - VITAMIN D,25 HYDROXY; Future  - CBC WITH DIFFERENTIAL; Future  - IRON/TOTAL IRON BIND; Future  - VITAMIN B12; Future  - FOLATE; Future    4. Fatigue, unspecified type  Patient complains of daily constant fatigue  May be related to opiate use but will get labs for eval  - morphine (MS IR) 15 MG tablet; TK 1 T PO  Q 6 H PRN FOR 30 DAYS; Refill: 0   - morphine ER (MS CONTIN) 15 MG Tab CR tablet; TK 1 T PO  Q 12 H FOR 30  DAYS; Refill: 0  - FREE THYROXINE; Future  - COMP METABOLIC PANEL; Future  - LIPID PROFILE; Future  - TRIIDOTHYRONINE; Future  - TSH; Future  - VITAMIN D,25 HYDROXY; Future  - CBC WITH DIFFERENTIAL; Future  - IRON/TOTAL IRON BIND; Future  - VITAMIN B12; Future  - FOLATE; Future  - HEPATITIS PANEL ACUTE(4 COMPONENTS); Future    5. Wheelchair bound      6. Need for vaccine for DT (diphtheria-tetanus)    - Zoster Vaccine Live (ZOSTAVAX) 82523 UNT/0.65ML Recon Susp; Inject 0.65 mL as instructed Once for 1 dose.  Dispense: 0.65 mL; Refill: 0    Past Medical History:  No date: Anxiety  No date: Chronic fatigue  No date: Depression  No date: DJD (degenerative joint disease)  No date: Encounter for long-term (current) use of other*      Comment: surya  No date: Fibromyalgia  No date: Hyperlipidemia  No date: Hypertension  No date: Lymphedema  No date: Migraine  No date: Osteoarthritis      Comment: wc bound due to pain in back and hips  No date: Wheelchair bound  Past Surgical History:  No date: CHOLECYSTECTOMY  No date: COLON RESECTION  No date: HERNIA REPAIR  No date: OTHER ABDOMINAL SURGERY  No date: OTHER ORTHOPEDIC SURGERY      Comment: neck  No date: TONSILLECTOMY  Smoking status: Current Every Day Smoker                                                   Packs/day: 0.50      Years: 0.00      Smokeless tobacco: Never Used                      Alcohol use: No              History reviewed.  No pertinent family history.      Current Outpatient Prescriptions: •  morphine (MS IR) 15 MG tablet, TK 1 T PO  Q 6 H PRN FOR 30 DAYS, Disp: , Rfl: 0•  morphine ER (MS CONTIN) 15 MG Tab CR tablet, TK 1 T PO  Q 12 H FOR 30 DAYS, Disp: , Rfl: 0•  Zoster Vaccine Live (ZOSTAVAX) 36947 UNT/0.65ML Recon Susp, Inject 0.65 mL as instructed Once for 1 dose., Disp: 0.65 mL, Rfl: 0•  potassium chloride ER (KLOR-CON) 10 MEQ tablet, Take 1 Tab by mouth 2 times a day., Disp: 60 Tab, Rfl: 3•  furosemide (LASIX) 20 MG Tab, TAKE 1 TABLET BY MOUTH  ONCE DAILY, Disp: 90 Tab, Rfl: 0•  Incontinence Supply Disposable (ATTENDS UNDERWEAR 7 LARGE) Misc, Use as needed, Disp: 432 Each, Rfl: 3•  metoprolol SR (TOPROL XL) 25 MG TABLET SR 24 HR, TAKE 1 TABLET BY MOUTH EVERY DAY, Disp: 90 Tab, Rfl: 1•  SUMAtriptan (IMITREX) 25 MG Tab tablet, Take 1-4 tabs onece daily prn.   Max dose 100 mg daily, Disp: 20 Tab, Rfl: 1•  amlodipine (NORVASC) 5 MG Tab, Take 1 Tab by mouth every day., Disp: 30 Tab, Rfl: 6•  hydrALAZINE (APRESOLINE) 25 MG Tab, Take 25 mg by mouth 3 times a day., Disp: , Rfl: •  paroxetine (PAXIL) 20 MG Tab, Take 20 mg by mouth every day., Disp: , Rfl: •  mirtazapine (REMERON) 30 MG Tab tablet, Take 30 mg by mouth every evening., Disp: , Rfl: •  fluconazole (DIFLUCAN) 100 MG Tab, Take 100 mg by mouth every day., Disp: , Rfl: •  tolterodine ER (DETROL LA) 4 MG CP24, Take 4 mg by mouth every day., Disp: , Rfl: •  pravastatin (PRAVACHOL) 20 MG TABS, Take 20 mg by mouth every evening., Disp: , Rfl: •  MORPHINE SULFATE 30 MG PO TABS, Take 30 mg by mouth every four hours as needed for Mild Pain., Disp: , Rfl: •  MELOXICAM 15 MG PO TABS, Take 15 mg by mouth every day., Disp: , Rfl: •  PROMETHAZINE HCL 25 MG PO TABS, Take 25 mg by mouth every 6 hours as needed for Nausea/Vomiting., Disp: , Rfl: •  SERTRALINE  MG PO TABS, Take 100 mg by mouth every day., Disp: , Rfl: •  amlodipine (NORVASC) 5 MG Tab, TAKE 1 TABLET BY MOUTH EVERY DAY, Disp: 90 Tab, Rfl: 1•  sulfamethoxazole-trimethoprim (BACTRIM DS) 800-160 MG tablet, Take 1 Tab by mouth 2 times a day., Disp: 20 Tab, Rfl: 0•  alprazolam (XANAX) 0.5 MG Tab, Take 0.5 mg by mouth at bedtime as needed for Sleep., Disp: , Rfl: •  busPIRone (BUSPAR) 15 MG tablet, Take 15 mg by mouth 2 times a day., Disp: , Rfl: •  buPROPion (WELLBUTRIN) 100 MG TABS, Take 100 mg by mouth 2 times a day., Disp: , Rfl: •  hydrocodone/acetaminophen (NORCO)  MG TABS, Take 1-2 Tabs by mouth every 6 hours as needed., Disp: , Rfl: •   DIAZEPAM 5 MG PO TABS, Take 5 mg by mouth every 6 hours as needed for Anxiety., Disp: , Rfl:     Patient was instructed on the use of medications, either prescriptions or OTC and informed on when the appropriate follow up time period should be. In addition, patient was also instructed that should any acute worsening occur that they should notify this clinic asap or call 911.            Review of Systems   Constitutional: Positive for malaise/fatigue. Negative for chills and fever.        Past Medical History:  No date: Anxiety  No date: Chronic fatigue  No date: Depression  No date: DJD (degenerative joint disease)  No date: Encounter for long-term (current) use of other*      Comment: surya  No date: Fibromyalgia  No date: Hyperlipidemia  No date: Hypertension  No date: Lymphedema  No date: Migraine  No date: Osteoarthritis      Comment: wc bound due to pain in back and hips  No date: Wheelchair bound  Past Surgical History:  No date: CHOLECYSTECTOMY  No date: COLON RESECTION  No date: HERNIA REPAIR  No date: OTHER ABDOMINAL SURGERY  No date: OTHER ORTHOPEDIC SURGERY      Comment: neck  No date: TONSILLECTOMY  Smoking status: Current Every Day Smoker                                                   Packs/day: 0.50      Years: 0.00      Smokeless tobacco: Never Used                      Alcohol use: No              History reviewed.  No pertinent family history.     HENT: Negative.    Eyes: Negative.    Respiratory: Negative.  Negative for cough and hemoptysis.    Cardiovascular: Negative.  Negative for chest pain and palpitations.   Gastrointestinal: Negative.  Negative for constipation.   Genitourinary: Negative.  Negative for dysuria and urgency.   Musculoskeletal: Positive for back pain, joint pain and myalgias. Negative for neck pain.   Skin: Negative.  Negative for rash.   Neurological: Negative.  Negative for dizziness and headaches.   Endo/Heme/Allergies: Negative.    Psychiatric/Behavioral: Negative.   Negative for suicidal ideas.          Objective:     /60   Pulse 70   Temp 36.9 °C (98.5 °F)   Resp 14   SpO2 97%      Physical Exam   Constitutional: She is oriented to person, place, and time. She appears well-developed and well-nourished. No distress.   HENT:   Head: Normocephalic and atraumatic.   Eyes: Pupils are equal, round, and reactive to light.   Neck: Normal range of motion. Neck supple.   Cardiovascular: Normal rate and regular rhythm.    No murmur heard.  Pulmonary/Chest: Effort normal and breath sounds normal. No respiratory distress. She has no wheezes. She has no rales. She exhibits no tenderness.   Abdominal: Soft. Bowel sounds are normal. She exhibits no distension. There is no tenderness.   Musculoskeletal: Normal range of motion.   wc bound, a + o x 3   Neurological: She is alert and oriented to person, place, and time. She has normal reflexes. No cranial nerve deficit.   Skin: Skin is warm and dry. She is not diaphoretic.   Psychiatric: She has a normal mood and affect. Her behavior is normal. Judgment and thought content normal.   Nursing note and vitals reviewed.              Assessment/Plan:     1. Primary osteoarthritis involving multiple joints    - morphine (MS IR) 15 MG tablet; TK 1 T PO  Q 6 H PRN FOR 30 DAYS; Refill: 0  - morphine ER (MS CONTIN) 15 MG Tab CR tablet; TK 1 T PO  Q 12 H FOR 30 DAYS; Refill: 0  - FREE THYROXINE; Future  - COMP METABOLIC PANEL; Future  - LIPID PROFILE; Future  - TRIIDOTHYRONINE; Future  - TSH; Future  - VITAMIN D,25 HYDROXY; Future  - CBC WITH DIFFERENTIAL; Future  - IRON/TOTAL IRON BIND; Future  - VITAMIN B12; Future  - FOLATE; Future    2. Benign essential HTN    - morphine (MS IR) 15 MG tablet; TK 1 T PO  Q 6 H PRN FOR 30 DAYS; Refill: 0  - morphine ER (MS CONTIN) 15 MG Tab CR tablet; TK 1 T PO  Q 12 H FOR 30 DAYS; Refill: 0  - FREE THYROXINE; Future  - COMP METABOLIC PANEL; Future  - LIPID PROFILE; Future  - TRIIDOTHYRONINE; Future  - TSH;  Future  - VITAMIN D,25 HYDROXY; Future  - CBC WITH DIFFERENTIAL; Future  - IRON/TOTAL IRON BIND; Future  - VITAMIN B12; Future  - FOLATE; Future    3. Opiate use    - morphine (MS IR) 15 MG tablet; TK 1 T PO  Q 6 H PRN FOR 30 DAYS; Refill: 0  - morphine ER (MS CONTIN) 15 MG Tab CR tablet; TK 1 T PO  Q 12 H FOR 30 DAYS; Refill: 0  - FREE THYROXINE; Future  - COMP METABOLIC PANEL; Future  - LIPID PROFILE; Future  - TRIIDOTHYRONINE; Future  - TSH; Future  - VITAMIN D,25 HYDROXY; Future  - CBC WITH DIFFERENTIAL; Future  - IRON/TOTAL IRON BIND; Future  - VITAMIN B12; Future  - FOLATE; Future    4. Fatigue, unspecified type    - morphine (MS IR) 15 MG tablet; TK 1 T PO  Q 6 H PRN FOR 30 DAYS; Refill: 0  - morphine ER (MS CONTIN) 15 MG Tab CR tablet; TK 1 T PO  Q 12 H FOR 30 DAYS; Refill: 0  - FREE THYROXINE; Future  - COMP METABOLIC PANEL; Future  - LIPID PROFILE; Future  - TRIIDOTHYRONINE; Future  - TSH; Future  - VITAMIN D,25 HYDROXY; Future  - CBC WITH DIFFERENTIAL; Future  - IRON/TOTAL IRON BIND; Future  - VITAMIN B12; Future  - FOLATE; Future  - HEPATITIS PANEL ACUTE(4 COMPONENTS); Future    5. Wheelchair bound      6. Need for vaccine for DT (diphtheria-tetanus)    - Zoster Vaccine Live (ZOSTAVAX) 98962 UNT/0.65ML Recon Susp; Inject 0.65 mL as instructed Once for 1 dose.  Dispense: 0.65 mL; Refill: 0

## 2018-02-14 NOTE — TELEPHONE ENCOUNTER
Was the patient seen in the last year in this department? Yes     Does patient have an active prescription for medications requested? Yes     Received Request Via: Patient   Pt states she was given this at the hospital and it works better than the Lasix.     Last visit 2/13/2018  Last labs 8/28/2017

## 2018-03-14 RX ORDER — TRIAMTERENE AND HYDROCHLOROTHIAZIDE 37.5; 25 MG/1; MG/1
TABLET ORAL
Qty: 30 TAB | Refills: 0 | Status: SHIPPED | OUTPATIENT
Start: 2018-03-14 | End: 2018-04-17 | Stop reason: SDUPTHER

## 2018-03-14 NOTE — TELEPHONE ENCOUNTER
Was the patient seen in the last year in this department? Yes     Does patient have an active prescription for medications requested? Yes     Received Request Via: Pharmacy     Last visit  2/13/2018  Last labs 8/25/2017

## 2018-03-15 DIAGNOSIS — I89.0 LYMPHEDEMA: ICD-10-CM

## 2018-03-15 RX ORDER — FUROSEMIDE 20 MG/1
TABLET ORAL
Qty: 90 TAB | Refills: 0 | Status: SHIPPED | OUTPATIENT
Start: 2018-03-15 | End: 2018-06-16 | Stop reason: SDUPTHER

## 2018-03-15 NOTE — TELEPHONE ENCOUNTER
Was the patient seen in the last year in this department? Yes     Does patient have an active prescription for medications requested? Yes     Received Request Via: Pharmacy     Last visit  02/13/2018  Last labs 08/31/2017

## 2018-04-14 DIAGNOSIS — Z99.3 WHEELCHAIR BOUND: ICD-10-CM

## 2018-04-14 DIAGNOSIS — Z78.0 POST-MENOPAUSAL: ICD-10-CM

## 2018-04-14 DIAGNOSIS — M19.90 OSTEOARTHRITIS, UNSPECIFIED OSTEOARTHRITIS TYPE, UNSPECIFIED SITE: ICD-10-CM

## 2018-04-14 DIAGNOSIS — Z00.00 WELL ADULT EXAM: ICD-10-CM

## 2018-04-14 DIAGNOSIS — Z12.31 ENCOUNTER FOR SCREENING MAMMOGRAM FOR HIGH-RISK PATIENT: ICD-10-CM

## 2018-04-14 DIAGNOSIS — I10 BENIGN ESSENTIAL HTN: ICD-10-CM

## 2018-04-14 DIAGNOSIS — I89.0 LYMPHEDEMA: ICD-10-CM

## 2018-04-14 DIAGNOSIS — E78.2 MIXED HYPERLIPIDEMIA: ICD-10-CM

## 2018-04-16 RX ORDER — AMLODIPINE BESYLATE 5 MG/1
TABLET ORAL
Qty: 90 TAB | Refills: 0 | Status: SHIPPED | OUTPATIENT
Start: 2018-04-16 | End: 2018-07-13 | Stop reason: SDUPTHER

## 2018-04-17 RX ORDER — TRIAMTERENE AND HYDROCHLOROTHIAZIDE 37.5; 25 MG/1; MG/1
TABLET ORAL
Qty: 90 TAB | Refills: 0 | Status: SHIPPED | OUTPATIENT
Start: 2018-04-17 | End: 2018-07-19 | Stop reason: SDUPTHER

## 2018-04-17 NOTE — TELEPHONE ENCOUNTER
Was the patient seen in the last year in this department? Yes     Does patient have an active prescription for medications requested? No     Received Request Via: Pharmacy     Last Visit: 02/13/18  Last Labs: 08/25/17  Next Appt: N/A

## 2018-04-24 DIAGNOSIS — E87.6 HYPOKALEMIA: ICD-10-CM

## 2018-04-24 RX ORDER — POTASSIUM CHLORIDE 750 MG/1
TABLET, FILM COATED, EXTENDED RELEASE ORAL
Qty: 180 TAB | Refills: 0 | Status: SHIPPED | OUTPATIENT
Start: 2018-04-24 | End: 2018-07-23 | Stop reason: SDUPTHER

## 2018-04-24 RX ORDER — POTASSIUM CHLORIDE 750 MG/1
TABLET, FILM COATED, EXTENDED RELEASE ORAL
Qty: 60 TAB | Refills: 0 | Status: SHIPPED | OUTPATIENT
Start: 2018-04-24 | End: 2018-04-24 | Stop reason: SDUPTHER

## 2018-05-08 DIAGNOSIS — I10 BENIGN ESSENTIAL HTN: ICD-10-CM

## 2018-05-08 DIAGNOSIS — Z00.00 WELL ADULT EXAM: ICD-10-CM

## 2018-05-08 DIAGNOSIS — E78.2 MIXED HYPERLIPIDEMIA: ICD-10-CM

## 2018-05-08 DIAGNOSIS — Z99.3 WHEELCHAIR BOUND: ICD-10-CM

## 2018-05-08 DIAGNOSIS — Z12.31 ENCOUNTER FOR SCREENING MAMMOGRAM FOR HIGH-RISK PATIENT: ICD-10-CM

## 2018-05-08 DIAGNOSIS — M19.90 OSTEOARTHRITIS, UNSPECIFIED OSTEOARTHRITIS TYPE, UNSPECIFIED SITE: ICD-10-CM

## 2018-05-08 DIAGNOSIS — I89.0 LYMPHEDEMA: ICD-10-CM

## 2018-05-08 DIAGNOSIS — Z78.0 POST-MENOPAUSAL: ICD-10-CM

## 2018-05-08 RX ORDER — METOPROLOL SUCCINATE 25 MG/1
TABLET, EXTENDED RELEASE ORAL
Qty: 90 TAB | Refills: 0 | Status: SHIPPED | OUTPATIENT
Start: 2018-05-08 | End: 2018-08-06 | Stop reason: SDUPTHER

## 2018-05-09 ENCOUNTER — HOSPITAL ENCOUNTER (OUTPATIENT)
Dept: LAB | Facility: MEDICAL CENTER | Age: 73
End: 2018-05-09
Attending: FAMILY MEDICINE
Payer: MEDICARE

## 2018-05-09 ENCOUNTER — OFFICE VISIT (OUTPATIENT)
Dept: MEDICAL GROUP | Facility: PHYSICIAN GROUP | Age: 73
End: 2018-05-09
Payer: MEDICARE

## 2018-05-09 VITALS
DIASTOLIC BLOOD PRESSURE: 90 MMHG | SYSTOLIC BLOOD PRESSURE: 142 MMHG | TEMPERATURE: 98.2 F | RESPIRATION RATE: 19 BRPM | HEART RATE: 61 BPM | OXYGEN SATURATION: 95 %

## 2018-05-09 DIAGNOSIS — M15.9 PRIMARY OSTEOARTHRITIS INVOLVING MULTIPLE JOINTS: ICD-10-CM

## 2018-05-09 DIAGNOSIS — I10 BENIGN ESSENTIAL HTN: ICD-10-CM

## 2018-05-09 DIAGNOSIS — R21 RASH: ICD-10-CM

## 2018-05-09 DIAGNOSIS — R53.83 FATIGUE, UNSPECIFIED TYPE: ICD-10-CM

## 2018-05-09 DIAGNOSIS — F11.90 OPIATE USE: ICD-10-CM

## 2018-05-09 LAB
25(OH)D3 SERPL-MCNC: 5 NG/ML (ref 30–100)
ALBUMIN SERPL BCP-MCNC: 3.9 G/DL (ref 3.2–4.9)
ALBUMIN/GLOB SERPL: 1.3 G/DL
ALP SERPL-CCNC: 78 U/L (ref 30–99)
ALT SERPL-CCNC: 6 U/L (ref 2–50)
ANION GAP SERPL CALC-SCNC: 7 MMOL/L (ref 0–11.9)
AST SERPL-CCNC: 11 U/L (ref 12–45)
BASOPHILS # BLD AUTO: 0.6 % (ref 0–1.8)
BASOPHILS # BLD: 0.03 K/UL (ref 0–0.12)
BILIRUB SERPL-MCNC: 0.3 MG/DL (ref 0.1–1.5)
BUN SERPL-MCNC: 15 MG/DL (ref 8–22)
CALCIUM SERPL-MCNC: 9.4 MG/DL (ref 8.5–10.5)
CHLORIDE SERPL-SCNC: 103 MMOL/L (ref 96–112)
CHOLEST SERPL-MCNC: 136 MG/DL (ref 100–199)
CO2 SERPL-SCNC: 28 MMOL/L (ref 20–33)
CREAT SERPL-MCNC: 0.84 MG/DL (ref 0.5–1.4)
EOSINOPHIL # BLD AUTO: 0.14 K/UL (ref 0–0.51)
EOSINOPHIL NFR BLD: 2.8 % (ref 0–6.9)
ERYTHROCYTE [DISTWIDTH] IN BLOOD BY AUTOMATED COUNT: 49.5 FL (ref 35.9–50)
FOLATE SERPL-MCNC: 3.4 NG/ML
GLOBULIN SER CALC-MCNC: 3.1 G/DL (ref 1.9–3.5)
GLUCOSE SERPL-MCNC: 88 MG/DL (ref 65–99)
HCT VFR BLD AUTO: 45.2 % (ref 37–47)
HDLC SERPL-MCNC: 35 MG/DL
HGB BLD-MCNC: 14.4 G/DL (ref 12–16)
IMM GRANULOCYTES # BLD AUTO: 0.01 K/UL (ref 0–0.11)
IMM GRANULOCYTES NFR BLD AUTO: 0.2 % (ref 0–0.9)
IRON SATN MFR SERPL: 22 % (ref 15–55)
IRON SERPL-MCNC: 85 UG/DL (ref 40–170)
LDLC SERPL CALC-MCNC: 75 MG/DL
LYMPHOCYTES # BLD AUTO: 1.89 K/UL (ref 1–4.8)
LYMPHOCYTES NFR BLD: 37.6 % (ref 22–41)
MCH RBC QN AUTO: 29.2 PG (ref 27–33)
MCHC RBC AUTO-ENTMCNC: 31.9 G/DL (ref 33.6–35)
MCV RBC AUTO: 91.7 FL (ref 81.4–97.8)
MONOCYTES # BLD AUTO: 0.38 K/UL (ref 0–0.85)
MONOCYTES NFR BLD AUTO: 7.6 % (ref 0–13.4)
NEUTROPHILS # BLD AUTO: 2.58 K/UL (ref 2–7.15)
NEUTROPHILS NFR BLD: 51.2 % (ref 44–72)
NRBC # BLD AUTO: 0 K/UL
NRBC BLD-RTO: 0 /100 WBC
PLATELET # BLD AUTO: 179 K/UL (ref 164–446)
PMV BLD AUTO: 10.2 FL (ref 9–12.9)
POTASSIUM SERPL-SCNC: 4.7 MMOL/L (ref 3.6–5.5)
PROT SERPL-MCNC: 7 G/DL (ref 6–8.2)
RBC # BLD AUTO: 4.93 M/UL (ref 4.2–5.4)
SODIUM SERPL-SCNC: 138 MMOL/L (ref 135–145)
T3 SERPL-MCNC: 74.3 NG/DL (ref 60–181)
T4 FREE SERPL-MCNC: 0.62 NG/DL (ref 0.53–1.43)
TIBC SERPL-MCNC: 389 UG/DL (ref 250–450)
TRIGL SERPL-MCNC: 130 MG/DL (ref 0–149)
TSH SERPL DL<=0.005 MIU/L-ACNC: 2.21 UIU/ML (ref 0.38–5.33)
VIT B12 SERPL-MCNC: 225 PG/ML (ref 211–911)
WBC # BLD AUTO: 5 K/UL (ref 4.8–10.8)

## 2018-05-09 PROCEDURE — 82306 VITAMIN D 25 HYDROXY: CPT

## 2018-05-09 PROCEDURE — 80061 LIPID PANEL: CPT

## 2018-05-09 PROCEDURE — 82607 VITAMIN B-12: CPT

## 2018-05-09 PROCEDURE — 83550 IRON BINDING TEST: CPT

## 2018-05-09 PROCEDURE — 83540 ASSAY OF IRON: CPT

## 2018-05-09 PROCEDURE — 82746 ASSAY OF FOLIC ACID SERUM: CPT

## 2018-05-09 PROCEDURE — 87522 HEPATITIS C REVRS TRNSCRPJ: CPT

## 2018-05-09 PROCEDURE — 80053 COMPREHEN METABOLIC PANEL: CPT

## 2018-05-09 PROCEDURE — 84443 ASSAY THYROID STIM HORMONE: CPT

## 2018-05-09 PROCEDURE — 99214 OFFICE O/P EST MOD 30 MIN: CPT | Performed by: FAMILY MEDICINE

## 2018-05-09 PROCEDURE — 84480 ASSAY TRIIODOTHYRONINE (T3): CPT

## 2018-05-09 PROCEDURE — 36415 COLL VENOUS BLD VENIPUNCTURE: CPT

## 2018-05-09 PROCEDURE — 80074 ACUTE HEPATITIS PANEL: CPT

## 2018-05-09 PROCEDURE — 85025 COMPLETE CBC W/AUTO DIFF WBC: CPT

## 2018-05-09 PROCEDURE — 84439 ASSAY OF FREE THYROXINE: CPT

## 2018-05-09 RX ORDER — PERMETHRIN 50 MG/G
CREAM TOPICAL
Qty: 1 TUBE | Refills: 0 | Status: SHIPPED | OUTPATIENT
Start: 2018-05-09

## 2018-05-09 NOTE — PATIENT INSTRUCTIONS
We will try treating for scabies with permethrin, it is possible that this is the cause.    It may also be dry skin.  Please use the mupirocin/topical antibiotic on the scabs to help them heal and use eucerine or aveeno on your skin daily to keep it from drying.

## 2018-05-09 NOTE — ASSESSMENT & PLAN NOTE
She has scabbing of her upper back for several months.  The skin is itchy and she scratches her back.  She reports that there are scabs on her back.  The symptoms started while she was at [facility].  She has been using cetaphil soap but it has not improved.  She does not have pruritis of any other location.

## 2018-05-10 ENCOUNTER — TELEPHONE (OUTPATIENT)
Dept: MEDICAL GROUP | Facility: PHYSICIAN GROUP | Age: 73
End: 2018-05-10

## 2018-05-10 NOTE — PROGRESS NOTES
CC:rash    HISTORY OF PRESENT ILLNESS: Patient is a 72 y.o. female established patient who presents today to discuss the following      Rash  She has scabbing of her upper back for several months.  The skin is itchy and she scratches her back.  She reports that there are scabs on her back.  The symptoms started while she was at [facility].  She has been using cetaphil soap but it has not improved.  She does not have pruritis of any other location.      Patient Active Problem List    Diagnosis Date Noted   • Rash 05/09/2018   • Lymphedema 08/10/2017   • Benign essential HTN 08/10/2017   • Mixed hyperlipidemia 08/10/2017   • Post-menopausal 08/10/2017   • Opiate use 08/10/2017   • Anxiety 08/10/2017   • Osteoarthritis    • Wheelchair bound       Allergies:Patient has no known allergies.    Current Outpatient Prescriptions   Medication Sig Dispense Refill   • permethrin (ELIMITE) 5 % Cream Apply to entire body from neck down, leave on for 8-14 hours before rinsing off 1 Tube 0   • mupirocin (BACTROBAN) 2 % Ointment Apply 1 Application to affected area(s) 2 times a day. 60 g 1   • metoprolol SR (TOPROL XL) 25 MG TABLET SR 24 HR TAKE 1 TABLET BY MOUTH EVERY DAY 90 Tab 0   • potassium chloride ER (KLOR-CON) 10 MEQ tablet TAKE 1 TABLET BY MOUTH TWICE DAILY 180 Tab 0   • triamterene-hctz (MAXZIDE-25/DYAZIDE) 37.5-25 MG Tab TAKE 1 TABLET BY MOUTH EVERY MORNING 90 Tab 0   • amLODIPine (NORVASC) 5 MG Tab TAKE 1 TABLET BY MOUTH EVERY DAY 90 Tab 0   • furosemide (LASIX) 20 MG Tab TAKE 1 TABLET BY MOUTH ONE TIME DAILY 90 Tab 0   • morphine ER (MS CONTIN) 15 MG Tab CR tablet TK 1 T PO  Q 12 H FOR 30 DAYS  0   • Incontinence Supply Disposable (ATTENDS UNDERWEAR 7 LARGE) Misc Use as needed (Patient taking differently: Use as needed.     This rx goes to [facility].) 432 Each 3   • SUMAtriptan (IMITREX) 25 MG Tab tablet Take 1-4 tabs onece daily prn.   Max dose 100 mg daily 20 Tab 1   • sulfamethoxazole-trimethoprim (BACTRIM  DS) 800-160 MG tablet Take 1 Tab by mouth 2 times a day. 20 Tab 0   • amlodipine (NORVASC) 5 MG Tab Take 1 Tab by mouth every day. 30 Tab 6   • hydrALAZINE (APRESOLINE) 25 MG Tab Take 25 mg by mouth 3 times a day.     • alprazolam (XANAX) 0.5 MG Tab Take 0.5 mg by mouth at bedtime as needed for Sleep.     • busPIRone (BUSPAR) 15 MG tablet Take 15 mg by mouth 2 times a day.     • paroxetine (PAXIL) 20 MG Tab Take 20 mg by mouth every day.     • mirtazapine (REMERON) 30 MG Tab tablet Take 30 mg by mouth every evening.     • fluconazole (DIFLUCAN) 100 MG Tab Take 100 mg by mouth every day.     • buPROPion (WELLBUTRIN) 100 MG TABS Take 100 mg by mouth 2 times a day.     • tolterodine ER (DETROL LA) 4 MG CP24 Take 4 mg by mouth every day.     • hydrocodone/acetaminophen (NORCO)  MG TABS Take 1-2 Tabs by mouth every 6 hours as needed.     • pravastatin (PRAVACHOL) 20 MG TABS Take 20 mg by mouth every evening.     • MORPHINE SULFATE 30 MG PO TABS Take 30 mg by mouth every four hours as needed for Mild Pain.     • MELOXICAM 15 MG PO TABS Take 15 mg by mouth every day.     • PROMETHAZINE HCL 25 MG PO TABS Take 25 mg by mouth every 6 hours as needed for Nausea/Vomiting.     • DIAZEPAM 5 MG PO TABS Take 5 mg by mouth every 6 hours as needed for Anxiety.     • SERTRALINE  MG PO TABS Take 100 mg by mouth every day.       No current facility-administered medications for this visit.        Social History   Substance Use Topics   • Smoking status: Current Every Day Smoker     Packs/day: 0.50   • Smokeless tobacco: Never Used   • Alcohol use No     Social History     Social History Narrative   • No narrative on file       No family history on file.    Review of Systems:      - Constitutional: Negative for fever, chills, unexpected weight change, and fatigue/generalized weakness.     - HEENT: Negative for headaches, vision changes, hearing changes, ear pain, ear discharge, rhinorrhea, sinus congestion, sore throat, and  neck pain.        - Endo/Heme/Allergies: Does not bruise/bleed easily.     Exam:    Blood pressure 142/90, pulse 61, temperature 36.8 °C (98.2 °F), resp. rate 19, SpO2 95 %. There is no height or weight on file to calculate BMI.    General:  Well nourished, well developed female in NAD  Skin:  The upper back has various stages of eschar with several pinkish papules which may be granulation tissue, the unaffected skin appears normal, mild erythema of several eschars is present, skin over extremities is normal appearing but slightly dry    Please note that this dictation was created using voice recognition software. I have made every reasonable attempt to correct obvious errors, but I expect that there are errors of grammar and possibly content that I did not discover before finalizing the note.    Assessment/Plan:  1. Rash  The differential for her rash includes psychogenic, dry skin, or possibly scabies. The presentation is not typical but there are a few papules that may represent a scabies infection and due to her concern it is reasonable to treat once. If it does not improve she is advised this is likely dry skin and she should use emollients and a topical antibacterial on the existing eschars. She was advised to follow up for worsening symptoms.  - permethrin (ELIMITE) 5 % Cream; Apply to entire body from neck down, leave on for 8-14 hours before rinsing off  Dispense: 1 Tube; Refill: 0  - mupirocin (BACTROBAN) 2 % Ointment; Apply 1 Application to affected area(s) 2 times a day.  Dispense: 60 g; Refill: 1

## 2018-05-10 NOTE — TELEPHONE ENCOUNTER
----- Message from Vinayak Forman M.D. sent at 5/10/2018 10:14 AM PDT -----  This patient needs an appointment within the next week. Please schedule this with the patient so we may discuss their lab results

## 2018-05-12 LAB
HCV RNA SERPL NAA+PROBE-ACNC: <15 IU/ML
HCV RNA SERPL NAA+PROBE-LOG IU: <1.2 LOG IU
HCV RNA SERPL QL NAA+PROBE: NOT DETECTED
PATHOLOGY STUDY: NORMAL

## 2018-05-22 ENCOUNTER — OFFICE VISIT (OUTPATIENT)
Dept: MEDICAL GROUP | Facility: PHYSICIAN GROUP | Age: 73
End: 2018-05-22
Payer: MEDICARE

## 2018-05-22 VITALS
SYSTOLIC BLOOD PRESSURE: 102 MMHG | RESPIRATION RATE: 12 BRPM | TEMPERATURE: 98.1 F | OXYGEN SATURATION: 94 % | HEART RATE: 63 BPM | DIASTOLIC BLOOD PRESSURE: 60 MMHG

## 2018-05-22 DIAGNOSIS — E55.9 VITAMIN D DEFICIENCY: ICD-10-CM

## 2018-05-22 DIAGNOSIS — E53.8 FOLATE DEFICIENCY: ICD-10-CM

## 2018-05-22 DIAGNOSIS — Z99.3 WHEELCHAIR BOUND: ICD-10-CM

## 2018-05-22 DIAGNOSIS — G43.009 MIGRAINE WITHOUT AURA AND WITHOUT STATUS MIGRAINOSUS, NOT INTRACTABLE: ICD-10-CM

## 2018-05-22 DIAGNOSIS — B18.2 CHRONIC HEPATITIS C WITHOUT HEPATIC COMA (HCC): ICD-10-CM

## 2018-05-22 DIAGNOSIS — F11.90 OPIATE USE: ICD-10-CM

## 2018-05-22 PROCEDURE — 99214 OFFICE O/P EST MOD 30 MIN: CPT | Performed by: FAMILY MEDICINE

## 2018-05-22 RX ORDER — FOLIC ACID 1 MG/1
1 TABLET ORAL DAILY
Qty: 30 TAB | Refills: 6 | Status: SHIPPED | OUTPATIENT
Start: 2018-05-22 | End: 2018-05-22 | Stop reason: SDUPTHER

## 2018-05-22 RX ORDER — SUMATRIPTAN 25 MG/1
TABLET, FILM COATED ORAL
Qty: 20 TAB | Refills: 1 | Status: SHIPPED | OUTPATIENT
Start: 2018-05-22 | End: 2018-11-29 | Stop reason: SDUPTHER

## 2018-05-22 RX ORDER — TOPIRAMATE 50 MG/1
50 TABLET, FILM COATED ORAL 3 TIMES DAILY
Qty: 90 TAB | Refills: 3 | Status: SHIPPED | OUTPATIENT
Start: 2018-05-22 | End: 2018-05-22 | Stop reason: SDUPTHER

## 2018-05-22 ASSESSMENT — ENCOUNTER SYMPTOMS
HEMOPTYSIS: 0
CONSTITUTIONAL NEGATIVE: 1
NECK PAIN: 1
PSYCHIATRIC NEGATIVE: 1
PALPITATIONS: 0
EYES NEGATIVE: 1
FEVER: 0
CARDIOVASCULAR NEGATIVE: 1
RESPIRATORY NEGATIVE: 1
COUGH: 0
HEADACHES: 0
NEUROLOGICAL NEGATIVE: 1
CHILLS: 0
GASTROINTESTINAL NEGATIVE: 1
CONSTIPATION: 0
MYALGIAS: 0
BACK PAIN: 1
DIZZINESS: 0

## 2018-05-23 NOTE — TELEPHONE ENCOUNTER
Was the patient seen in the last year in this department? Yes     Does patient have an active prescription for medications requested? Yes     Received Request Via: Pharmacy     Last Visit: 5/22/18  Last Labs: 5/9/18

## 2018-05-23 NOTE — PROGRESS NOTES
Subjective:      Claudia Brumfield is a 72 y.o. female who presents with Hypertension            1. Vitamin D deficiency  Low on labs will replace  - cholecalciferol ([employer] VIT D 5000 HIGH-POTENCY) 5000 UNIT Cap; Take 1 Cap by mouth every day.  Dispense: 30 Cap; Refill: 6  - VITAMIN D,25 HYDROXY; Future    2. Folate deficiency  Low on labs but rbc normal will replace  - folic acid (FOLVITE) 1 MG Tab; Take 1 Tab by mouth every day.  Dispense: 30 Tab; Refill: 6  - FOLATE; Future    3. Wheelchair bound      4. Opiate use  Pain meds per pain clinic    5. Chronic hepatitis C without hepatic coma (HCC)  No active rna on new labs    6. Migraine without aura and without status migrainosus, not intractable  The patient's current medical issue is well controlled on the current therapy with no new symptoms or worsening    - SUMAtriptan (IMITREX) 25 MG Tab tablet; Take 1-4 tabs onece daily prn.   Max dose 100 mg daily  Dispense: 20 Tab; Refill: 1  - topiramate (TOPAMAX) 50 MG tablet; Take 1 Tab by mouth 3 times a day.  Dispense: 90 Tab; Refill: 3    Past Medical History:  No date: Anxiety  No date: Chronic fatigue  No date: Depression  No date: DJD (degenerative joint disease)  No date: Encounter for long-term (current) use of other*      Comment: surya  No date: Fibromyalgia  No date: Hyperlipidemia  No date: Hypertension  No date: Lymphedema  No date: Migraine  No date: Osteoarthritis      Comment: wc bound due to pain in back and hips  No date: Wheelchair bound  Past Surgical History:  No date: CHOLECYSTECTOMY  No date: COLON RESECTION  No date: HERNIA REPAIR  No date: OTHER ABDOMINAL SURGERY  No date: OTHER ORTHOPEDIC SURGERY      Comment: neck  No date: TONSILLECTOMY  Smoking status: Current Every Day Smoker                                                   Packs/day: 0.50      Years: 0.00      Smokeless tobacco: Never Used                      Alcohol use: No              No family history on file.      Current Outpatient  Prescriptions: •  cholecalciferol ([employer] VIT D 5000 HIGH-POTENCY) 5000 UNIT Cap, Take 1 Cap by mouth every day., Disp: 30 Cap, Rfl: 6•  folic acid (FOLVITE) 1 MG Tab, Take 1 Tab by mouth every day., Disp: 30 Tab, Rfl: 6•  SUMAtriptan (IMITREX) 25 MG Tab tablet, Take 1-4 tabs onece daily prn.   Max dose 100 mg daily, Disp: 20 Tab, Rfl: 1•  topiramate (TOPAMAX) 50 MG tablet, Take 1 Tab by mouth 3 times a day., Disp: 90 Tab, Rfl: 3•  permethrin (ELIMITE) 5 % Cream, Apply to entire body from neck down, leave on for 8-14 hours before rinsing off, Disp: 1 Tube, Rfl: 0•  mupirocin (BACTROBAN) 2 % Ointment, Apply 1 Application to affected area(s) 2 times a day., Disp: 60 g, Rfl: 1•  metoprolol SR (TOPROL XL) 25 MG TABLET SR 24 HR, TAKE 1 TABLET BY MOUTH EVERY DAY, Disp: 90 Tab, Rfl: 0•  potassium chloride ER (KLOR-CON) 10 MEQ tablet, TAKE 1 TABLET BY MOUTH TWICE DAILY, Disp: 180 Tab, Rfl: 0•  triamterene-hctz (MAXZIDE-25/DYAZIDE) 37.5-25 MG Tab, TAKE 1 TABLET BY MOUTH EVERY MORNING, Disp: 90 Tab, Rfl: 0•  amLODIPine (NORVASC) 5 MG Tab, TAKE 1 TABLET BY MOUTH EVERY DAY, Disp: 90 Tab, Rfl: 0•  furosemide (LASIX) 20 MG Tab, TAKE 1 TABLET BY MOUTH ONE TIME DAILY, Disp: 90 Tab, Rfl: 0•  morphine ER (MS CONTIN) 15 MG Tab CR tablet, TK 1 T PO  Q 12 H FOR 30 DAYS, Disp: , Rfl: 0•  Incontinence Supply Disposable (ATTENDS UNDERWEAR 7 LARGE) Misc, Use as needed (Patient taking differently: Use as needed.   This rx goes to [facility] Disp: 432 Each, Rfl: 3•  sulfamethoxazole-trimethoprim (BACTRIM DS) 800-160 MG tablet, Take 1 Tab by mouth 2 times a day., Disp: 20 Tab, Rfl: 0•  amlodipine (NORVASC) 5 MG Tab, Take 1 Tab by mouth every day., Disp: 30 Tab, Rfl: 6•  hydrALAZINE (APRESOLINE) 25 MG Tab, Take 25 mg by mouth 3 times a day., Disp: , Rfl: •  alprazolam (XANAX) 0.5 MG Tab, Take 0.5 mg by mouth at bedtime as needed for Sleep., Disp: , Rfl: •  busPIRone (BUSPAR) 15 MG tablet, Take 15 mg by mouth 2 times a day., Disp: , Rfl: •   paroxetine (PAXIL) 20 MG Tab, Take 20 mg by mouth every day., Disp: , Rfl: •  mirtazapine (REMERON) 30 MG Tab tablet, Take 30 mg by mouth every evening., Disp: , Rfl: •  fluconazole (DIFLUCAN) 100 MG Tab, Take 100 mg by mouth every day., Disp: , Rfl: •  buPROPion (WELLBUTRIN) 100 MG TABS, Take 100 mg by mouth 2 times a day., Disp: , Rfl: •  tolterodine ER (DETROL LA) 4 MG CP24, Take 4 mg by mouth every day., Disp: , Rfl: •  hydrocodone/acetaminophen (NORCO)  MG TABS, Take 1-2 Tabs by mouth every 6 hours as needed., Disp: , Rfl: •  pravastatin (PRAVACHOL) 20 MG TABS, Take 20 mg by mouth every evening., Disp: , Rfl: •  MORPHINE SULFATE 30 MG PO TABS, Take 30 mg by mouth every four hours as needed for Mild Pain., Disp: , Rfl: •  MELOXICAM 15 MG PO TABS, Take 15 mg by mouth every day., Disp: , Rfl: •  PROMETHAZINE HCL 25 MG PO TABS, Take 25 mg by mouth every 6 hours as needed for Nausea/Vomiting., Disp: , Rfl: •  DIAZEPAM 5 MG PO TABS, Take 5 mg by mouth every 6 hours as needed for Anxiety., Disp: , Rfl: •  SERTRALINE  MG PO TABS, Take 100 mg by mouth every day., Disp: , Rfl:     Patient was instructed on the use of medications, either prescriptions or OTC and informed on when the appropriate follow up time period should be. In addition, patient was also instructed that should any acute worsening occur that they should notify this clinic asap or call 911.            Review of Systems   Constitutional: Negative.  Negative for chills and fever.        Past Medical History:  No date: Anxiety  No date: Chronic fatigue  No date: Depression  No date: DJD (degenerative joint disease)  No date: Encounter for long-term (current) use of other*      Comment: surya  No date: Fibromyalgia  No date: Hyperlipidemia  No date: Hypertension  No date: Lymphedema  No date: Migraine  No date: Osteoarthritis      Comment: wc bound due to pain in back and hips  No date: Wheelchair bound  Past Surgical History:  No date:  CHOLECYSTECTOMY  No date: COLON RESECTION  No date: HERNIA REPAIR  No date: OTHER ABDOMINAL SURGERY  No date: OTHER ORTHOPEDIC SURGERY      Comment: neck  No date: TONSILLECTOMY  Smoking status: Current Every Day Smoker                                                   Packs/day: 0.50      Years: 0.00      Smokeless tobacco: Never Used                      Alcohol use: No              No family history on file.     HENT: Negative.    Eyes: Negative.    Respiratory: Negative.  Negative for cough and hemoptysis.    Cardiovascular: Negative.  Negative for chest pain and palpitations.   Gastrointestinal: Negative.  Negative for constipation.   Genitourinary: Negative.  Negative for dysuria and urgency.   Musculoskeletal: Positive for back pain, joint pain and neck pain. Negative for myalgias.   Skin: Negative.  Negative for rash.   Neurological: Negative.  Negative for dizziness and headaches.   Endo/Heme/Allergies: Negative.    Psychiatric/Behavioral: Negative.  Negative for suicidal ideas.          Objective:     /60   Pulse 63   Temp 36.7 °C (98.1 °F)   Resp 12   SpO2 94%      Physical Exam   Constitutional: She is oriented to person, place, and time. She appears well-developed and well-nourished. No distress.   HENT:   Head: Normocephalic and atraumatic.   Mouth/Throat: Oropharynx is clear and moist. No oropharyngeal exudate.   Eyes: Pupils are equal, round, and reactive to light.   Cardiovascular: Normal rate, regular rhythm, normal heart sounds and intact distal pulses.  Exam reveals no gallop and no friction rub.    No murmur heard.  Pulmonary/Chest: Effort normal and breath sounds normal. No respiratory distress. She has no wheezes. She has no rales. She exhibits no tenderness.   Musculoskeletal:   WC bound due to back pain, lungs clear today and heart regular rate and rythym     Neurological: She is alert and oriented to person, place, and time.   Skin: She is not diaphoretic.   Psychiatric: She has a  normal mood and affect. Her behavior is normal. Judgment and thought content normal.   Nursing note and vitals reviewed.              Assessment/Plan:     1. Vitamin D deficiency    - cholecalciferol ([employer]S VIT D 5000 HIGH-POTENCY) 5000 UNIT Cap; Take 1 Cap by mouth every day.  Dispense: 30 Cap; Refill: 6  - VITAMIN D,25 HYDROXY; Future    2. Folate deficiency    - folic acid (FOLVITE) 1 MG Tab; Take 1 Tab by mouth every day.  Dispense: 30 Tab; Refill: 6  - FOLATE; Future    3. Wheelchair bound      4. Opiate use      5. Chronic hepatitis C without hepatic coma (HCC)      6. Migraine without aura and without status migrainosus, not intractable    - SUMAtriptan (IMITREX) 25 MG Tab tablet; Take 1-4 tabs onece daily prn.   Max dose 100 mg daily  Dispense: 20 Tab; Refill: 1  - topiramate (TOPAMAX) 50 MG tablet; Take 1 Tab by mouth 3 times a day.  Dispense: 90 Tab; Refill: 3

## 2018-05-24 RX ORDER — TOPIRAMATE 50 MG/1
TABLET, FILM COATED ORAL
Qty: 270 TAB | Refills: 3 | Status: SHIPPED | OUTPATIENT
Start: 2018-05-24 | End: 2019-06-02 | Stop reason: SDUPTHER

## 2018-05-24 RX ORDER — FOLIC ACID 1 MG/1
TABLET ORAL
Qty: 90 TAB | Refills: 6 | Status: SHIPPED | OUTPATIENT
Start: 2018-05-24 | End: 2018-11-29 | Stop reason: SDUPTHER

## 2018-06-16 DIAGNOSIS — I89.0 LYMPHEDEMA: ICD-10-CM

## 2018-06-18 RX ORDER — FUROSEMIDE 20 MG/1
TABLET ORAL
Qty: 90 TAB | Refills: 0 | Status: SHIPPED | OUTPATIENT
Start: 2018-06-18 | End: 2018-09-19 | Stop reason: SDUPTHER

## 2018-06-18 NOTE — TELEPHONE ENCOUNTER
Was the patient seen in the last year in this department? Yes     Does patient have an active prescription for medications requested? No     Received Request Via: Pharmacy     Last Visit: 05/22/18  Last Labs: 05/09/18  Next Appt: N/A

## 2018-07-13 DIAGNOSIS — M19.90 OSTEOARTHRITIS, UNSPECIFIED OSTEOARTHRITIS TYPE, UNSPECIFIED SITE: ICD-10-CM

## 2018-07-13 DIAGNOSIS — Z99.3 WHEELCHAIR BOUND: ICD-10-CM

## 2018-07-13 DIAGNOSIS — Z78.0 POST-MENOPAUSAL: ICD-10-CM

## 2018-07-13 DIAGNOSIS — I89.0 LYMPHEDEMA: ICD-10-CM

## 2018-07-13 DIAGNOSIS — I10 BENIGN ESSENTIAL HTN: ICD-10-CM

## 2018-07-13 DIAGNOSIS — Z00.00 WELL ADULT EXAM: ICD-10-CM

## 2018-07-13 DIAGNOSIS — Z12.31 ENCOUNTER FOR SCREENING MAMMOGRAM FOR HIGH-RISK PATIENT: ICD-10-CM

## 2018-07-13 DIAGNOSIS — E78.2 MIXED HYPERLIPIDEMIA: ICD-10-CM

## 2018-07-16 RX ORDER — AMLODIPINE BESYLATE 5 MG/1
TABLET ORAL
Qty: 90 TAB | Refills: 0 | Status: SHIPPED | OUTPATIENT
Start: 2018-07-16 | End: 2018-10-17 | Stop reason: SDUPTHER

## 2018-07-19 RX ORDER — TRIAMTERENE AND HYDROCHLOROTHIAZIDE 37.5; 25 MG/1; MG/1
TABLET ORAL
Qty: 90 TAB | Refills: 0 | Status: SHIPPED | OUTPATIENT
Start: 2018-07-19 | End: 2018-10-17 | Stop reason: SDUPTHER

## 2018-07-23 DIAGNOSIS — E87.6 HYPOKALEMIA: ICD-10-CM

## 2018-07-23 RX ORDER — POTASSIUM CHLORIDE 750 MG/1
TABLET, EXTENDED RELEASE ORAL
Qty: 90 TAB | Refills: 0 | Status: SHIPPED | OUTPATIENT
Start: 2018-07-23 | End: 2018-09-03 | Stop reason: SDUPTHER

## 2018-07-23 NOTE — TELEPHONE ENCOUNTER
Was the patient seen in the last year in this department? Yes    Does patient have an active prescription for medications requested? Yes     Received Request Via: Pharmacy       Last visit: 05/22/18  Last labs:05/09/18

## 2018-07-31 DIAGNOSIS — Z12.31 VISIT FOR SCREENING MAMMOGRAM: ICD-10-CM

## 2018-08-06 DIAGNOSIS — Z78.0 POST-MENOPAUSAL: ICD-10-CM

## 2018-08-06 DIAGNOSIS — I89.0 LYMPHEDEMA: ICD-10-CM

## 2018-08-06 DIAGNOSIS — M19.90 OSTEOARTHRITIS, UNSPECIFIED OSTEOARTHRITIS TYPE, UNSPECIFIED SITE: ICD-10-CM

## 2018-08-06 DIAGNOSIS — Z99.3 WHEELCHAIR BOUND: ICD-10-CM

## 2018-08-06 DIAGNOSIS — Z00.00 WELL ADULT EXAM: ICD-10-CM

## 2018-08-06 DIAGNOSIS — E78.2 MIXED HYPERLIPIDEMIA: ICD-10-CM

## 2018-08-06 DIAGNOSIS — Z12.31 ENCOUNTER FOR SCREENING MAMMOGRAM FOR HIGH-RISK PATIENT: ICD-10-CM

## 2018-08-06 DIAGNOSIS — I10 BENIGN ESSENTIAL HTN: ICD-10-CM

## 2018-08-06 RX ORDER — METOPROLOL SUCCINATE 25 MG/1
TABLET, EXTENDED RELEASE ORAL
Qty: 90 TAB | Refills: 0 | Status: SHIPPED | OUTPATIENT
Start: 2018-08-06 | End: 2018-11-06 | Stop reason: SDUPTHER

## 2018-09-03 DIAGNOSIS — E87.6 HYPOKALEMIA: ICD-10-CM

## 2018-09-04 RX ORDER — POTASSIUM CHLORIDE 750 MG/1
TABLET, EXTENDED RELEASE ORAL
Qty: 180 TAB | Refills: 0 | Status: SHIPPED | OUTPATIENT
Start: 2018-09-04 | End: 2018-12-02 | Stop reason: SDUPTHER

## 2018-09-17 DIAGNOSIS — G43.009 MIGRAINE WITHOUT AURA AND WITHOUT STATUS MIGRAINOSUS, NOT INTRACTABLE: ICD-10-CM

## 2018-09-17 RX ORDER — TOPIRAMATE 50 MG/1
TABLET, FILM COATED ORAL
Qty: 270 TAB | Refills: 0 | Status: SHIPPED | OUTPATIENT
Start: 2018-09-17 | End: 2018-12-15 | Stop reason: SDUPTHER

## 2018-09-19 DIAGNOSIS — I89.0 LYMPHEDEMA: ICD-10-CM

## 2018-09-19 RX ORDER — FUROSEMIDE 20 MG/1
TABLET ORAL
Qty: 90 TAB | Refills: 1 | Status: SHIPPED | OUTPATIENT
Start: 2018-09-19 | End: 2019-03-04 | Stop reason: SDUPTHER

## 2018-10-04 DIAGNOSIS — Z12.31 VISIT FOR SCREENING MAMMOGRAM: ICD-10-CM

## 2018-10-04 LAB
HAV IGM SERPL QL IA: NEGATIVE
HBV CORE IGM SER QL: NEGATIVE
HBV SURFACE AG SER QL: NEGATIVE
HCV AB SER QL: REACTIVE

## 2018-10-17 DIAGNOSIS — Z12.31 ENCOUNTER FOR SCREENING MAMMOGRAM FOR HIGH-RISK PATIENT: ICD-10-CM

## 2018-10-17 DIAGNOSIS — E78.2 MIXED HYPERLIPIDEMIA: ICD-10-CM

## 2018-10-17 DIAGNOSIS — Z78.0 POST-MENOPAUSAL: ICD-10-CM

## 2018-10-17 DIAGNOSIS — Z00.00 WELL ADULT EXAM: ICD-10-CM

## 2018-10-17 DIAGNOSIS — I89.0 LYMPHEDEMA: ICD-10-CM

## 2018-10-17 DIAGNOSIS — M19.90 OSTEOARTHRITIS, UNSPECIFIED OSTEOARTHRITIS TYPE, UNSPECIFIED SITE: ICD-10-CM

## 2018-10-17 DIAGNOSIS — Z99.3 WHEELCHAIR BOUND: ICD-10-CM

## 2018-10-17 DIAGNOSIS — I10 BENIGN ESSENTIAL HTN: ICD-10-CM

## 2018-10-17 RX ORDER — AMLODIPINE BESYLATE 5 MG/1
TABLET ORAL
Qty: 90 TAB | Refills: 1 | Status: SHIPPED | OUTPATIENT
Start: 2018-10-17 | End: 2019-04-06 | Stop reason: SDUPTHER

## 2018-10-17 RX ORDER — TRIAMTERENE AND HYDROCHLOROTHIAZIDE 37.5; 25 MG/1; MG/1
TABLET ORAL
Qty: 90 TAB | Refills: 1 | Status: SHIPPED | OUTPATIENT
Start: 2018-10-17 | End: 2019-04-12 | Stop reason: SDUPTHER

## 2018-11-06 DIAGNOSIS — I10 BENIGN ESSENTIAL HTN: ICD-10-CM

## 2018-11-06 DIAGNOSIS — Z99.3 WHEELCHAIR BOUND: ICD-10-CM

## 2018-11-06 DIAGNOSIS — Z78.0 POST-MENOPAUSAL: ICD-10-CM

## 2018-11-06 DIAGNOSIS — Z12.31 ENCOUNTER FOR SCREENING MAMMOGRAM FOR HIGH-RISK PATIENT: ICD-10-CM

## 2018-11-06 DIAGNOSIS — I89.0 LYMPHEDEMA: ICD-10-CM

## 2018-11-06 DIAGNOSIS — Z00.00 WELL ADULT EXAM: ICD-10-CM

## 2018-11-06 DIAGNOSIS — E78.2 MIXED HYPERLIPIDEMIA: ICD-10-CM

## 2018-11-06 DIAGNOSIS — M19.90 OSTEOARTHRITIS, UNSPECIFIED OSTEOARTHRITIS TYPE, UNSPECIFIED SITE: ICD-10-CM

## 2018-11-06 RX ORDER — METOPROLOL SUCCINATE 25 MG/1
TABLET, EXTENDED RELEASE ORAL
Qty: 90 TAB | Refills: 0 | Status: SHIPPED | OUTPATIENT
Start: 2018-11-06 | End: 2019-02-01 | Stop reason: SDUPTHER

## 2018-11-06 NOTE — TELEPHONE ENCOUNTER
Was the patient seen in the last year in this department? Yes    Does patient have an active prescription for medications requested? Yes    Received Request Via: Pharmacy    Last Visit: 05/22/2018  Last Lab: 05/09/2018

## 2018-11-29 ENCOUNTER — OFFICE VISIT (OUTPATIENT)
Dept: MEDICAL GROUP | Facility: PHYSICIAN GROUP | Age: 73
End: 2018-11-29
Payer: MEDICARE

## 2018-11-29 VITALS
TEMPERATURE: 96.5 F | OXYGEN SATURATION: 93 % | HEART RATE: 54 BPM | RESPIRATION RATE: 12 BRPM | DIASTOLIC BLOOD PRESSURE: 60 MMHG | SYSTOLIC BLOOD PRESSURE: 90 MMHG

## 2018-11-29 DIAGNOSIS — L98.9 SKIN LESION: ICD-10-CM

## 2018-11-29 DIAGNOSIS — J40 BRONCHITIS: ICD-10-CM

## 2018-11-29 DIAGNOSIS — F11.90 OPIATE USE: ICD-10-CM

## 2018-11-29 DIAGNOSIS — Z23 NEED FOR PNEUMOCOCCAL VACCINATION: ICD-10-CM

## 2018-11-29 DIAGNOSIS — E53.8 FOLATE DEFICIENCY: ICD-10-CM

## 2018-11-29 DIAGNOSIS — G43.009 MIGRAINE WITHOUT AURA AND WITHOUT STATUS MIGRAINOSUS, NOT INTRACTABLE: ICD-10-CM

## 2018-11-29 DIAGNOSIS — I10 BENIGN ESSENTIAL HTN: ICD-10-CM

## 2018-11-29 PROCEDURE — G0009 ADMIN PNEUMOCOCCAL VACCINE: HCPCS | Performed by: FAMILY MEDICINE

## 2018-11-29 PROCEDURE — 90670 PCV13 VACCINE IM: CPT | Performed by: FAMILY MEDICINE

## 2018-11-29 PROCEDURE — 99214 OFFICE O/P EST MOD 30 MIN: CPT | Mod: 25 | Performed by: FAMILY MEDICINE

## 2018-11-29 RX ORDER — FOLIC ACID 1 MG/1
1 TABLET ORAL
Qty: 90 TAB | Refills: 6 | Status: SHIPPED | OUTPATIENT
Start: 2018-11-29

## 2018-11-29 RX ORDER — SUMATRIPTAN 25 MG/1
TABLET, FILM COATED ORAL
Qty: 20 TAB | Refills: 1 | Status: SHIPPED | OUTPATIENT
Start: 2018-11-29 | End: 2019-04-03 | Stop reason: SDUPTHER

## 2018-11-29 RX ORDER — AMOXICILLIN 500 MG/1
500 CAPSULE ORAL 3 TIMES DAILY
Qty: 30 CAP | Refills: 0 | Status: SHIPPED | OUTPATIENT
Start: 2018-11-29

## 2018-11-29 ASSESSMENT — ENCOUNTER SYMPTOMS
GASTROINTESTINAL NEGATIVE: 1
BACK PAIN: 1
HEADACHES: 0
PALPITATIONS: 0
PSYCHIATRIC NEGATIVE: 1
FEVER: 0
NECK PAIN: 0
DIZZINESS: 0
COUGH: 1
CONSTIPATION: 0
EYES NEGATIVE: 1
HEMOPTYSIS: 0
MYALGIAS: 0
CARDIOVASCULAR NEGATIVE: 1
NEUROLOGICAL NEGATIVE: 1
CONSTITUTIONAL NEGATIVE: 1
CHILLS: 0

## 2018-11-29 NOTE — PROGRESS NOTES
Subjective:      Claudia Brumfield is a 72 y.o. female who presents with Pain; Orders Needed (power wheelchair); Edema (on left side of neck); URI; and Cough            1. Folate deficiency    - folic acid (FOLVITE) 1 MG Tab; Take 1 Tab by mouth every day.  Dispense: 90 Tab; Refill: 6    2. Migraine without aura and without status migrainosus, not intractable  The patient's current medical issue is well controlled on the current therapy with no new symptoms or worsening    - SUMAtriptan (IMITREX) 25 MG Tab tablet; Take 1-4 tabs onece daily prn.   Max dose 100 mg daily  Dispense: 20 Tab; Refill: 1    3. Need for pneumococcal vaccination    - Pneumococcal Conjugate Vaccine 13-Valent <4yo IM    4. Benign essential HTN  Currently treated for HTN, taking meds with no CP or sob, monitors bp at home periodically. controlled      5. Opiate use  Per pain clinic    6. Skin lesion  Left cheek itchy  - REFERRAL TO DERMATOLOGY    7. Bronchitis  Cough for 3 weeks, no fever or chills  - amoxicillin (AMOXIL) 500 MG Cap; Take 1 Cap by mouth 3 times a day.  Dispense: 30 Cap; Refill: 0    Past Medical History:  No date: Anxiety  No date: Chronic fatigue  No date: Depression  No date: DJD (degenerative joint disease)  No date: Encounter for long-term (current) use of other medications      Comment:  surya  No date: Fibromyalgia  No date: Hyperlipidemia  No date: Hypertension  No date: Lymphedema  No date: Migraine  No date: Osteoarthritis      Comment:  wc bound due to pain in back and hips  No date: Wheelchair bound  Past Surgical History:  No date: CHOLECYSTECTOMY  No date: COLON RESECTION  No date: HERNIA REPAIR  No date: OTHER ABDOMINAL SURGERY  No date: OTHER ORTHOPEDIC SURGERY      Comment:  neck  No date: TONSILLECTOMY  Smoking status: Current Every Day Smoker                                                   Packs/day: 0.50      Years: 0.00      Smokeless tobacco: Never Used                      Alcohol use: No              No  family history on file.      Current Outpatient Prescriptions: •  folic acid (FOLVITE) 1 MG Tab, Take 1 Tab by mouth every day., Disp: 90 Tab, Rfl: 6•  SUMAtriptan (IMITREX) 25 MG Tab tablet, Take 1-4 tabs onece daily prn.   Max dose 100 mg daily, Disp: 20 Tab, Rfl: 1•  amoxicillin (AMOXIL) 500 MG Cap, Take 1 Cap by mouth 3 times a day., Disp: 30 Cap, Rfl: 0•  metoprolol SR (TOPROL XL) 25 MG TABLET SR 24 HR, TAKE 1 TABLET BY MOUTH EVERY DAY, Disp: 90 Tab, Rfl: 0•  amLODIPine (NORVASC) 5 MG Tab, TAKE 1 TABLET BY MOUTH EVERY DAY, Disp: 90 Tab, Rfl: 1•  triamterene-hctz (MAXZIDE-25/DYAZIDE) 37.5-25 MG Tab, TAKE 1 TABLET BY MOUTH EVERY MORNING, Disp: 90 Tab, Rfl: 1•  furosemide (LASIX) 20 MG Tab, TAKE 1 TABLET BY MOUTH EVERY DAY, Disp: 90 Tab, Rfl: 1•  topiramate (TOPAMAX) 50 MG tablet, TAKE 1 TABLET BY MOUTH THREE TIMES DAILY, Disp: 270 Tab, Rfl: 0•  potassium chloride SA (K-DUR) 10 MEQ Tab CR, TAKE 1 TABLET BY MOUTH TWICE DAILY, Disp: 180 Tab, Rfl: 0•  topiramate (TOPAMAX) 50 MG tablet, TAKE 1 TABLET BY MOUTH THREE TIMES DAILY, Disp: 270 Tab, Rfl: 3•  cholecalciferol ([employer] VIT D 5000 HIGH-POTENCY) 5000 UNIT Cap, Take 1 Cap by mouth every day., Disp: 30 Cap, Rfl: 6•  permethrin (ELIMITE) 5 % Cream, Apply to entire body from neck down, leave on for 8-14 hours before rinsing off, Disp: 1 Tube, Rfl: 0•  mupirocin (BACTROBAN) 2 % Ointment, Apply 1 Application to affected area(s) 2 times a day., Disp: 60 g, Rfl: 1•  morphine ER (MS CONTIN) 15 MG Tab CR tablet, TK 1 T PO  Q 12 H FOR 30 DAYS, Disp: , Rfl: 0•  Incontinence Supply Disposable (ATTENDS UNDERWEAR 7 LARGE) Misc, Use as needed (Patient taking differently: Use as needed.   This rx goes to [facility] Disp: 432 Each, Rfl: 3•  sulfamethoxazole-trimethoprim (BACTRIM DS) 800-160 MG tablet, Take 1 Tab by mouth 2 times a day., Disp: 20 Tab, Rfl: 0•  amlodipine (NORVASC) 5 MG Tab, Take 1 Tab by mouth every day., Disp: 30 Tab, Rfl: 6•  hydrALAZINE (APRESOLINE) 25 MG  Tab, Take 25 mg by mouth 3 times a day., Disp: , Rfl: •  alprazolam (XANAX) 0.5 MG Tab, Take 0.5 mg by mouth at bedtime as needed for Sleep., Disp: , Rfl: •  busPIRone (BUSPAR) 15 MG tablet, Take 15 mg by mouth 2 times a day., Disp: , Rfl: •  paroxetine (PAXIL) 20 MG Tab, Take 20 mg by mouth every day., Disp: , Rfl: •  mirtazapine (REMERON) 30 MG Tab tablet, Take 30 mg by mouth every evening., Disp: , Rfl: •  fluconazole (DIFLUCAN) 100 MG Tab, Take 100 mg by mouth every day., Disp: , Rfl: •  buPROPion (WELLBUTRIN) 100 MG TABS, Take 100 mg by mouth 2 times a day., Disp: , Rfl: •  tolterodine ER (DETROL LA) 4 MG CP24, Take 4 mg by mouth every day., Disp: , Rfl: •  hydrocodone/acetaminophen (NORCO)  MG TABS, Take 1-2 Tabs by mouth every 6 hours as needed., Disp: , Rfl: •  pravastatin (PRAVACHOL) 20 MG TABS, Take 20 mg by mouth every evening., Disp: , Rfl: •  MORPHINE SULFATE 30 MG PO TABS, Take 30 mg by mouth every four hours as needed for Mild Pain., Disp: , Rfl: •  MELOXICAM 15 MG PO TABS, Take 15 mg by mouth every day., Disp: , Rfl: •  PROMETHAZINE HCL 25 MG PO TABS, Take 25 mg by mouth every 6 hours as needed for Nausea/Vomiting., Disp: , Rfl: •  DIAZEPAM 5 MG PO TABS, Take 5 mg by mouth every 6 hours as needed for Anxiety., Disp: , Rfl: •  SERTRALINE  MG PO TABS, Take 100 mg by mouth every day., Disp: , Rfl:     Patient was instructed on the use of medications, either prescriptions or OTC and informed on when the appropriate follow up time period should be. In addition, patient was also instructed that should any acute worsening occur that they should notify this clinic asap or call 911.            Review of Systems   Constitutional: Negative.  Negative for chills and fever.        Past Medical History:  No date: Anxiety  No date: Chronic fatigue  No date: Depression  No date: DJD (degenerative joint disease)  No date: Encounter for long-term (current) use of other medications      Comment:   surya  No date: Fibromyalgia  No date: Hyperlipidemia  No date: Hypertension  No date: Lymphedema  No date: Migraine  No date: Osteoarthritis      Comment:  wc bound due to pain in back and hips  No date: Wheelchair bound  Past Surgical History:  No date: CHOLECYSTECTOMY  No date: COLON RESECTION  No date: HERNIA REPAIR  No date: OTHER ABDOMINAL SURGERY  No date: OTHER ORTHOPEDIC SURGERY      Comment:  neck  No date: TONSILLECTOMY  Smoking status: Current Every Day Smoker                                                   Packs/day: 0.50      Years: 0.00      Smokeless tobacco: Never Used                      Alcohol use: No              No family history on file.     HENT: Negative.    Eyes: Negative.    Respiratory: Positive for cough. Negative for hemoptysis.    Cardiovascular: Negative.  Negative for chest pain and palpitations.   Gastrointestinal: Negative.  Negative for constipation.   Genitourinary: Negative.  Negative for dysuria and urgency.   Musculoskeletal: Positive for back pain and joint pain. Negative for myalgias and neck pain.   Skin: Positive for itching. Negative for rash.   Neurological: Negative.  Negative for dizziness and headaches.   Endo/Heme/Allergies: Negative.    Psychiatric/Behavioral: Negative.  Negative for suicidal ideas.          Objective:     BP (!) 90/60 (BP Location: Right arm, Patient Position: Sitting)   Pulse (!) 54   Temp 35.8 °C (96.5 °F)   Resp 12   SpO2 93%      Physical Exam   Constitutional: She is oriented to person, place, and time. She appears well-developed and well-nourished. No distress.   HENT:   Head: Normocephalic and atraumatic.   Mouth/Throat: Oropharynx is clear and moist. No oropharyngeal exudate.   Eyes: Pupils are equal, round, and reactive to light.   Cardiovascular: Normal rate, regular rhythm, normal heart sounds and intact distal pulses.  Exam reveals no gallop and no friction rub.    No murmur heard.  Pulmonary/Chest: Effort normal and breath  sounds normal. No respiratory distress. She has no wheezes. She has no rales. She exhibits no tenderness.   Musculoskeletal:   wc bound due to back pain   Neurological: She is alert and oriented to person, place, and time.   Skin: She is not diaphoretic.   3 by 3 mm lesion on left cheek, flat slightly erythematous   Psychiatric: She has a normal mood and affect. Her behavior is normal. Judgment and thought content normal.   Nursing note and vitals reviewed.              Assessment/Plan:     1. Folate deficiency    - folic acid (FOLVITE) 1 MG Tab; Take 1 Tab by mouth every day.  Dispense: 90 Tab; Refill: 6    2. Migraine without aura and without status migrainosus, not intractable    - SUMAtriptan (IMITREX) 25 MG Tab tablet; Take 1-4 tabs onece daily prn.   Max dose 100 mg daily  Dispense: 20 Tab; Refill: 1    3. Need for pneumococcal vaccination    - Pneumococcal Conjugate Vaccine 13-Valent <4yo IM    4. Benign essential HTN      5. Opiate use      6. Skin lesion    - REFERRAL TO DERMATOLOGY    7. Bronchitis    - amoxicillin (AMOXIL) 500 MG Cap; Take 1 Cap by mouth 3 times a day.  Dispense: 30 Cap; Refill: 0

## 2018-12-02 DIAGNOSIS — E87.6 HYPOKALEMIA: ICD-10-CM

## 2018-12-03 RX ORDER — POTASSIUM CHLORIDE 750 MG/1
TABLET, EXTENDED RELEASE ORAL
Qty: 180 TAB | Refills: 0 | Status: SHIPPED | OUTPATIENT
Start: 2018-12-03 | End: 2019-02-20 | Stop reason: SDUPTHER

## 2018-12-15 DIAGNOSIS — G43.009 MIGRAINE WITHOUT AURA AND WITHOUT STATUS MIGRAINOSUS, NOT INTRACTABLE: ICD-10-CM

## 2018-12-17 RX ORDER — TOPIRAMATE 50 MG/1
TABLET, FILM COATED ORAL
Qty: 270 TAB | Refills: 0 | Status: SHIPPED | OUTPATIENT
Start: 2018-12-17

## 2018-12-17 NOTE — TELEPHONE ENCOUNTER
Was the patient seen in the last year in this department? Yes    Does patient have an active prescription for medications requested? Yes    Received Request Via: Pharmacy        Last visit: 11/29/18  Last labs:5/9/18

## 2019-02-01 DIAGNOSIS — I10 BENIGN ESSENTIAL HTN: ICD-10-CM

## 2019-02-01 DIAGNOSIS — E78.2 MIXED HYPERLIPIDEMIA: ICD-10-CM

## 2019-02-01 DIAGNOSIS — M19.90 OSTEOARTHRITIS, UNSPECIFIED OSTEOARTHRITIS TYPE, UNSPECIFIED SITE: ICD-10-CM

## 2019-02-01 DIAGNOSIS — Z00.00 WELL ADULT EXAM: ICD-10-CM

## 2019-02-01 DIAGNOSIS — Z12.31 ENCOUNTER FOR SCREENING MAMMOGRAM FOR HIGH-RISK PATIENT: ICD-10-CM

## 2019-02-01 DIAGNOSIS — I89.0 LYMPHEDEMA: ICD-10-CM

## 2019-02-01 DIAGNOSIS — Z78.0 POST-MENOPAUSAL: ICD-10-CM

## 2019-02-01 DIAGNOSIS — Z99.3 WHEELCHAIR BOUND: ICD-10-CM

## 2019-02-04 RX ORDER — METOPROLOL SUCCINATE 25 MG/1
TABLET, EXTENDED RELEASE ORAL
Qty: 90 TAB | Refills: 1 | Status: SHIPPED | OUTPATIENT
Start: 2019-02-04 | End: 2019-07-24 | Stop reason: SDUPTHER

## 2019-02-20 DIAGNOSIS — E87.6 HYPOKALEMIA: ICD-10-CM

## 2019-02-20 NOTE — TELEPHONE ENCOUNTER
Was the patient seen in the last year in this department? Yes    Does patient have an active prescription for medications requested? Yes    Received Request Via: Pharmacy     Last Visit: 11/29/18  Last Labs: 5/9/18

## 2019-02-21 RX ORDER — POTASSIUM CHLORIDE 750 MG/1
TABLET, EXTENDED RELEASE ORAL
Qty: 180 EACH | Refills: 0 | Status: SHIPPED | OUTPATIENT
Start: 2019-02-21

## 2019-03-04 DIAGNOSIS — I89.0 LYMPHEDEMA: ICD-10-CM

## 2019-03-04 RX ORDER — FUROSEMIDE 20 MG/1
TABLET ORAL
Qty: 90 TAB | Refills: 0 | Status: SHIPPED | OUTPATIENT
Start: 2019-03-04 | End: 2019-06-02 | Stop reason: SDUPTHER

## 2019-03-04 NOTE — TELEPHONE ENCOUNTER
Was the patient seen in the last year in this department? Yes    Does patient have an active prescription for medications requested? Yes    Received Request Via: Pharmacy     Last visit:11/29/19  Last lab:5/9/18

## 2019-04-03 DIAGNOSIS — G43.009 MIGRAINE WITHOUT AURA AND WITHOUT STATUS MIGRAINOSUS, NOT INTRACTABLE: ICD-10-CM

## 2019-04-03 RX ORDER — SUMATRIPTAN 25 MG/1
TABLET, FILM COATED ORAL
Qty: 20 TAB | Refills: 0 | Status: SHIPPED | OUTPATIENT
Start: 2019-04-03 | End: 2019-06-02 | Stop reason: SDUPTHER

## 2019-04-06 DIAGNOSIS — I10 BENIGN ESSENTIAL HTN: ICD-10-CM

## 2019-04-06 DIAGNOSIS — Z12.31 ENCOUNTER FOR SCREENING MAMMOGRAM FOR HIGH-RISK PATIENT: ICD-10-CM

## 2019-04-06 DIAGNOSIS — E78.2 MIXED HYPERLIPIDEMIA: ICD-10-CM

## 2019-04-06 DIAGNOSIS — Z99.3 WHEELCHAIR BOUND: ICD-10-CM

## 2019-04-06 DIAGNOSIS — Z00.00 WELL ADULT EXAM: ICD-10-CM

## 2019-04-06 DIAGNOSIS — Z78.0 POST-MENOPAUSAL: ICD-10-CM

## 2019-04-06 DIAGNOSIS — M19.90 OSTEOARTHRITIS, UNSPECIFIED OSTEOARTHRITIS TYPE, UNSPECIFIED SITE: ICD-10-CM

## 2019-04-06 DIAGNOSIS — I89.0 LYMPHEDEMA: ICD-10-CM

## 2019-04-08 RX ORDER — AMLODIPINE BESYLATE 5 MG/1
TABLET ORAL
Qty: 90 TAB | Refills: 3 | Status: SHIPPED | OUTPATIENT
Start: 2019-04-08 | End: 2020-02-28 | Stop reason: SDUPTHER

## 2019-04-12 DIAGNOSIS — I10 HYPERTENSION, UNSPECIFIED TYPE: Primary | ICD-10-CM

## 2019-04-15 RX ORDER — TRIAMTERENE AND HYDROCHLOROTHIAZIDE 37.5; 25 MG/1; MG/1
TABLET ORAL
Qty: 90 TAB | Refills: 0 | Status: SHIPPED | OUTPATIENT
Start: 2019-04-15

## 2019-06-02 DIAGNOSIS — G43.009 MIGRAINE WITHOUT AURA AND WITHOUT STATUS MIGRAINOSUS, NOT INTRACTABLE: ICD-10-CM

## 2019-06-02 DIAGNOSIS — I89.0 LYMPHEDEMA: ICD-10-CM

## 2019-06-03 RX ORDER — TOPIRAMATE 50 MG/1
TABLET, FILM COATED ORAL
Qty: 270 TAB | Refills: 0 | Status: SHIPPED | OUTPATIENT
Start: 2019-06-03 | End: 2019-08-31 | Stop reason: SDUPTHER

## 2019-06-03 RX ORDER — FUROSEMIDE 20 MG/1
TABLET ORAL
Qty: 90 TAB | Refills: 0 | Status: SHIPPED | OUTPATIENT
Start: 2019-06-03 | End: 2019-08-31 | Stop reason: SDUPTHER

## 2019-06-03 RX ORDER — SUMATRIPTAN 25 MG/1
TABLET, FILM COATED ORAL
Qty: 20 TAB | Refills: 0 | Status: SHIPPED | OUTPATIENT
Start: 2019-06-03 | End: 2019-08-02 | Stop reason: SDUPTHER

## 2019-06-03 NOTE — TELEPHONE ENCOUNTER
Was the patient seen in the last year in this department? Yes    Does patient have an active prescription for medications requested? Yes    Received Request Via: Pharmacy     Last Visit:11/29/2018  Last Lab:05/09/2018

## 2019-07-24 DIAGNOSIS — Z00.00 WELL ADULT EXAM: ICD-10-CM

## 2019-07-24 DIAGNOSIS — Z99.3 WHEELCHAIR BOUND: ICD-10-CM

## 2019-07-24 DIAGNOSIS — M19.90 OSTEOARTHRITIS, UNSPECIFIED OSTEOARTHRITIS TYPE, UNSPECIFIED SITE: ICD-10-CM

## 2019-07-24 DIAGNOSIS — E78.2 MIXED HYPERLIPIDEMIA: ICD-10-CM

## 2019-07-24 DIAGNOSIS — Z12.31 ENCOUNTER FOR SCREENING MAMMOGRAM FOR HIGH-RISK PATIENT: ICD-10-CM

## 2019-07-24 DIAGNOSIS — I89.0 LYMPHEDEMA: ICD-10-CM

## 2019-07-24 DIAGNOSIS — Z78.0 POST-MENOPAUSAL: ICD-10-CM

## 2019-07-24 DIAGNOSIS — I10 BENIGN ESSENTIAL HTN: ICD-10-CM

## 2019-07-24 RX ORDER — METOPROLOL SUCCINATE 25 MG/1
TABLET, EXTENDED RELEASE ORAL
Qty: 90 TAB | Refills: 0 | Status: SHIPPED | OUTPATIENT
Start: 2019-07-24 | End: 2019-08-31 | Stop reason: SDUPTHER

## 2019-08-02 DIAGNOSIS — G43.009 MIGRAINE WITHOUT AURA AND WITHOUT STATUS MIGRAINOSUS, NOT INTRACTABLE: ICD-10-CM

## 2019-08-02 NOTE — TELEPHONE ENCOUNTER
Was the patient seen in the last year in this department? Yes    Does patient have an active prescription for medications requested? Yes    Received Request Via: Pharmacy     Last Visit:11/29/2019  Last Lab:05/09/2018

## 2019-08-04 RX ORDER — SUMATRIPTAN 25 MG/1
TABLET, FILM COATED ORAL
Qty: 20 TAB | Refills: 0 | Status: SHIPPED | OUTPATIENT
Start: 2019-08-04

## 2019-08-31 DIAGNOSIS — Z78.0 POST-MENOPAUSAL: ICD-10-CM

## 2019-08-31 DIAGNOSIS — Z00.00 WELL ADULT EXAM: ICD-10-CM

## 2019-08-31 DIAGNOSIS — G43.009 MIGRAINE WITHOUT AURA AND WITHOUT STATUS MIGRAINOSUS, NOT INTRACTABLE: ICD-10-CM

## 2019-08-31 DIAGNOSIS — E78.2 MIXED HYPERLIPIDEMIA: ICD-10-CM

## 2019-08-31 DIAGNOSIS — Z99.3 WHEELCHAIR BOUND: ICD-10-CM

## 2019-08-31 DIAGNOSIS — Z12.31 ENCOUNTER FOR SCREENING MAMMOGRAM FOR HIGH-RISK PATIENT: ICD-10-CM

## 2019-08-31 DIAGNOSIS — M19.90 OSTEOARTHRITIS, UNSPECIFIED OSTEOARTHRITIS TYPE, UNSPECIFIED SITE: ICD-10-CM

## 2019-08-31 DIAGNOSIS — I89.0 LYMPHEDEMA: ICD-10-CM

## 2019-08-31 DIAGNOSIS — I10 BENIGN ESSENTIAL HTN: ICD-10-CM

## 2019-09-03 RX ORDER — FUROSEMIDE 20 MG/1
TABLET ORAL
Qty: 100 TAB | Refills: 1 | Status: SHIPPED | OUTPATIENT
Start: 2019-09-03 | End: 2020-02-28 | Stop reason: SDUPTHER

## 2019-09-03 RX ORDER — METOPROLOL SUCCINATE 25 MG/1
TABLET, EXTENDED RELEASE ORAL
Qty: 100 TAB | Refills: 1 | Status: SHIPPED | OUTPATIENT
Start: 2019-09-03

## 2019-09-03 RX ORDER — TOPIRAMATE 50 MG/1
TABLET, FILM COATED ORAL
Qty: 300 TAB | Refills: 1 | Status: SHIPPED | OUTPATIENT
Start: 2019-09-03 | End: 2020-02-28 | Stop reason: SDUPTHER

## 2019-11-11 ENCOUNTER — OFFICE VISIT (OUTPATIENT)
Dept: MEDICAL GROUP | Facility: PHYSICIAN GROUP | Age: 74
End: 2019-11-11
Payer: MEDICARE

## 2019-11-11 VITALS
TEMPERATURE: 97.4 F | DIASTOLIC BLOOD PRESSURE: 80 MMHG | HEART RATE: 66 BPM | OXYGEN SATURATION: 95 % | SYSTOLIC BLOOD PRESSURE: 124 MMHG

## 2019-11-11 DIAGNOSIS — Z09 HOSPITAL DISCHARGE FOLLOW-UP: ICD-10-CM

## 2019-11-11 DIAGNOSIS — Z23 NEED FOR VACCINATION: ICD-10-CM

## 2019-11-11 DIAGNOSIS — I89.0 LYMPHEDEMA: ICD-10-CM

## 2019-11-11 DIAGNOSIS — Z99.3 WHEELCHAIR BOUND: ICD-10-CM

## 2019-11-11 DIAGNOSIS — I10 BENIGN ESSENTIAL HTN: ICD-10-CM

## 2019-11-11 DIAGNOSIS — M19.90 OSTEOARTHRITIS, UNSPECIFIED OSTEOARTHRITIS TYPE, UNSPECIFIED SITE: ICD-10-CM

## 2019-11-11 DIAGNOSIS — G89.29 OTHER CHRONIC PAIN: ICD-10-CM

## 2019-11-11 PROCEDURE — 99214 OFFICE O/P EST MOD 30 MIN: CPT | Mod: 25 | Performed by: FAMILY MEDICINE

## 2019-11-11 PROCEDURE — G0008 ADMIN INFLUENZA VIRUS VAC: HCPCS | Performed by: FAMILY MEDICINE

## 2019-11-11 PROCEDURE — 90662 IIV NO PRSV INCREASED AG IM: CPT | Performed by: FAMILY MEDICINE

## 2019-11-11 RX ORDER — GENTAMICIN SULFATE 1 MG/G
OINTMENT TOPICAL
Refills: 3 | COMMUNITY
Start: 2019-09-03

## 2019-11-11 RX ORDER — CLONAZEPAM 0.5 MG/1
TABLET ORAL
Refills: 2 | COMMUNITY
Start: 2019-09-03

## 2019-11-11 RX ORDER — PAROXETINE HYDROCHLORIDE 40 MG/1
40 TABLET, FILM COATED ORAL DAILY
Qty: 30 TAB | Refills: 3 | Status: SHIPPED | OUTPATIENT
Start: 2019-11-11

## 2019-11-11 RX ORDER — DOXYCYCLINE HYCLATE 100 MG
TABLET ORAL
Refills: 0 | COMMUNITY
Start: 2019-10-20

## 2019-11-11 ASSESSMENT — ENCOUNTER SYMPTOMS
FEVER: 0
NEUROLOGICAL NEGATIVE: 1
CHILLS: 0
HEMOPTYSIS: 0
MYALGIAS: 1
BACK PAIN: 1
EYES NEGATIVE: 1
CARDIOVASCULAR NEGATIVE: 1
BLURRED VISION: 0
SHORTNESS OF BREATH: 1
NAUSEA: 0
DEPRESSION: 1
COUGH: 0
HEADACHES: 0
DIZZINESS: 0
HEARTBURN: 0
GASTROINTESTINAL NEGATIVE: 1
PALPITATIONS: 0
BRUISES/BLEEDS EASILY: 0
CONSTITUTIONAL NEGATIVE: 1
DOUBLE VISION: 0
TINGLING: 0

## 2019-11-11 ASSESSMENT — PATIENT HEALTH QUESTIONNAIRE - PHQ9
SUM OF ALL RESPONSES TO PHQ QUESTIONS 1-9: 27
CLINICAL INTERPRETATION OF PHQ2 SCORE: 6
5. POOR APPETITE OR OVEREATING: 3 - NEARLY EVERY DAY

## 2019-11-11 NOTE — PROGRESS NOTES
Subjective:      Claudia Brumfield is a 73 y.o. female who presents with Follow-Up (FV from [facility] visit over a month ago. Pt reports a migraine and body aches.)            1. Hospital discharge follow-up  Had her pain pump eval with [facility] in [facility] and got hypoxic and admitted overnight for copd exacerbation. Treated with steroids and abx and now back home and breathing better     2. Need for vaccination    - INFLUENZA VACCINE, HIGH DOSE (65+ ONLY)    3. Wheelchair bound      4. Osteoarthritis, unspecified osteoarthritis type, unspecified site      5. Other chronic pain  Has appt with pain clinic next week  Some sx of depression regarding her condition and pain level, advised her to speak with pain clinic about her pain but will increase paxil to 40 to see if that helps  - PARoxetine (PAXIL) 40 MG tablet; Take 1 Tab by mouth every day.  Dispense: 30 Tab; Refill: 3  - Patient has been identified as having a positive depression screening. Appropriate orders and counseling have been given.    6. Lymphedema  stable    7. Benign essential HTN  Currently treated for HTN, taking meds with no CP or sob, monitors bp at home periodically. controlled      Past Medical History:  No date: Anxiety  No date: Chronic fatigue  No date: Depression  No date: DJD (degenerative joint disease)  No date: Encounter for long-term (current) use of other medications      Comment:  surya  No date: Fibromyalgia  No date: Hyperlipidemia  No date: Hypertension  No date: Lymphedema  No date: Migraine  No date: Osteoarthritis      Comment:  wc bound due to pain in back and hips  No date: Wheelchair bound  Past Surgical History:  No date: CHOLECYSTECTOMY  No date: COLON RESECTION  No date: HERNIA REPAIR  No date: OTHER ABDOMINAL SURGERY  No date: OTHER ORTHOPEDIC SURGERY      Comment:  neck  No date: TONSILLECTOMY  Social History    Tobacco Use      Smoking status: Current Every Day Smoker        Packs/day: 0.25      Smokeless tobacco: Never  Used      Tobacco comment: Smokes Native cigarettes 3 qd    Alcohol use: No    Drug use: No    History reviewed.  No pertinent family history.      Current Outpatient Medications: •  clonazePAM (KLONOPIN) 0.5 MG Tab, TK 1 T PO  BID PRN, Disp: , Rfl: 2•  gentamicin (GARAMYCIN) 0.1 % Ointment, JETHRO TO WOUND BID UNTIL HEALED. PLEASE CONTACT OFFICE FOR APPOINTMENT, Disp: , Rfl: 3•  PARoxetine (PAXIL) 40 MG tablet, Take 1 Tab by mouth every day., Disp: 30 Tab, Rfl: 3•  metoprolol SR (TOPROL XL) 25 MG TABLET SR 24 HR, TAKE 1 TABLET BY MOUTH EVERY DAY, Disp: 100 Tab, Rfl: 1•  SUMAtriptan (IMITREX) 25 MG Tab tablet, TAKE 1 TO 4 TABLETS BY MOUTH DAILY AS NEEDED. MAX DOSE  MG DAILY, Disp: 20 Tab, Rfl: 0•  topiramate (TOPAMAX) 50 MG tablet, TAKE 1 TABLET BY MOUTH THREE TIMES DAILY, Disp: 270 Tab, Rfl: 0•  mupirocin (BACTROBAN) 2 % Ointment, Apply 1 Application to affected area(s) 2 times a day., Disp: 60 g, Rfl: 1•  Incontinence Supply Disposable (ATTENDS UNDERWEAR 7 LARGE) Misc, Use as needed (Patient taking differently: Use as needed.   This rx goes to [facility] Disp: 432 Each, Rfl: 3•  amlodipine (NORVASC) 5 MG Tab, Take 1 Tab by mouth every day., Disp: 30 Tab, Rfl: 6•  mirtazapine (REMERON) 30 MG Tab tablet, Take 30 mg by mouth every evening., Disp: , Rfl: •  MORPHINE SULFATE 30 MG PO TABS, Take 30 mg by mouth every four hours as needed for Mild Pain., Disp: , Rfl: •  MELOXICAM 15 MG PO TABS, Take 15 mg by mouth every day., Disp: , Rfl: •  doxycycline (VIBRAMYCIN) 100 MG Tab, TK 1 T PO BID, Disp: , Rfl: 0•  furosemide (LASIX) 20 MG Tab, TAKE 1 TABLET BY MOUTH EVERY MORNING OR AS DIRECTED (Patient not taking: Reported on 11/11/2019), Disp: 100 Tab, Rfl: 1•  topiramate (TOPAMAX) 50 MG tablet, TAKE 1 TABLET BY MOUTH THREE TIMES DAILY (Patient not taking: Reported on 11/11/2019), Disp: 300 Tab, Rfl: 1•  triamterene-hctz (MAXZIDE-25/DYAZIDE) 37.5-25 MG Tab, TAKE 1 TABLET BY MOUTH EVERY MORNING (Patient not taking:  Reported on 11/11/2019), Disp: 90 Tab, Rfl: 0•  amLODIPine (NORVASC) 5 MG Tab, TAKE 1 TABLET BY MOUTH EVERY DAY (Patient not taking: Reported on 11/11/2019), Disp: 90 Tab, Rfl: 3•  potassium chloride SA (K-DUR) 10 MEQ Tab CR, TAKE 1 TABLET BY MOUTH TWICE DAILY (Patient not taking: Reported on 11/11/2019), Disp: 180 Each, Rfl: 0•  folic acid (FOLVITE) 1 MG Tab, Take 1 Tab by mouth every day. (Patient not taking: Reported on 11/11/2019), Disp: 90 Tab, Rfl: 6•  amoxicillin (AMOXIL) 500 MG Cap, Take 1 Cap by mouth 3 times a day. (Patient not taking: Reported on 11/11/2019), Disp: 30 Cap, Rfl: 0•  cholecalciferol (CVS VIT D 5000 HIGH-POTENCY) 5000 UNIT Cap, Take 1 Cap by mouth every day. (Patient not taking: Reported on 11/11/2019), Disp: 30 Cap, Rfl: 6•  permethrin (ELIMITE) 5 % Cream, Apply to entire body from neck down, leave on for 8-14 hours before rinsing off (Patient not taking: Reported on 11/11/2019), Disp: 1 Tube, Rfl: 0•  morphine ER (MS CONTIN) 15 MG Tab CR tablet, TK 1 T PO  Q 12 H FOR 30 DAYS, Disp: , Rfl: 0•  sulfamethoxazole-trimethoprim (BACTRIM DS) 800-160 MG tablet, Take 1 Tab by mouth 2 times a day. (Patient not taking: Reported on 11/11/2019), Disp: 20 Tab, Rfl: 0•  hydrALAZINE (APRESOLINE) 25 MG Tab, Take 25 mg by mouth 3 times a day., Disp: , Rfl: •  alprazolam (XANAX) 0.5 MG Tab, Take 0.5 mg by mouth at bedtime as needed for Sleep., Disp: , Rfl: •  busPIRone (BUSPAR) 15 MG tablet, Take 15 mg by mouth 2 times a day., Disp: , Rfl: •  fluconazole (DIFLUCAN) 100 MG Tab, Take 100 mg by mouth every day., Disp: , Rfl:   •  buPROPion (WELLBUTRIN) 100 MG TABS, Take 100 mg by mouth 2 times a day., Disp: , Rfl: •  tolterodine ER (DETROL LA) 4 MG CP24, Take 4 mg by mouth every day., Disp: , Rfl: •  hydrocodone/acetaminophen (NORCO)  MG TABS, Take 1-2 Tabs by mouth every 6 hours as needed., Disp: , Rfl: •  pravastatin (PRAVACHOL) 20 MG TABS, Take 20 mg by mouth every evening., Disp: , Rfl: •   PROMETHAZINE HCL 25 MG PO TABS, Take 25 mg by mouth every 6 hours as needed for Nausea/Vomiting., Disp: , Rfl: •  DIAZEPAM 5 MG PO TABS, Take 5 mg by mouth every 6 hours as needed for Anxiety., Disp: , Rfl: •  SERTRALINE  MG PO TABS, Take 100 mg by mouth every day., Disp: , Rfl:     Patient was instructed on the use of medications, either prescriptions or OTC and informed on when the appropriate follow up time period should be. In addition, patient was also instructed that should any acute worsening occur that they should notify this clinic asap or call 911.          Review of Systems   Constitutional: Negative.  Negative for chills and fever.   HENT: Negative.  Negative for hearing loss.    Eyes: Negative.  Negative for blurred vision and double vision.   Respiratory: Positive for shortness of breath. Negative for cough and hemoptysis.    Cardiovascular: Negative.  Negative for chest pain and palpitations.   Gastrointestinal: Negative.  Negative for heartburn and nausea.   Genitourinary: Negative.  Negative for dysuria.   Musculoskeletal: Positive for back pain, joint pain and myalgias.   Skin: Negative.  Negative for rash.   Neurological: Negative.  Negative for dizziness, tingling and headaches.   Endo/Heme/Allergies: Negative.  Does not bruise/bleed easily.   Psychiatric/Behavioral: Positive for depression. Negative for suicidal ideas.   All other systems reviewed and are negative.         Objective:     /80 (BP Location: Right arm, Patient Position: Sitting, BP Cuff Size: Large adult long)   Pulse 66   Temp 36.3 °C (97.4 °F) (Temporal)   SpO2 95%      Physical Exam  Vitals signs and nursing note reviewed.   Constitutional:       General: She is not in acute distress.     Appearance: She is well-developed. She is not diaphoretic.   HENT:      Head: Normocephalic and atraumatic.      Mouth/Throat:      Pharynx: No oropharyngeal exudate.   Eyes:      Pupils: Pupils are equal, round, and reactive to  light.   Cardiovascular:      Rate and Rhythm: Normal rate and regular rhythm.      Heart sounds: Normal heart sounds. No murmur. No friction rub. No gallop.    Pulmonary:      Effort: Pulmonary effort is normal. No respiratory distress.      Breath sounds: Normal breath sounds. No wheezing or rales.   Chest:      Chest wall: No tenderness.   Musculoskeletal:      Comments: Stable lymphedema  Lungs clear   Neurological:      Mental Status: She is alert and oriented to person, place, and time.   Psychiatric:         Behavior: Behavior normal.         Thought Content: Thought content normal.         Judgment: Judgment normal.                 Assessment/Plan:       1. Hospital discharge follow-up      2. Need for vaccination    - INFLUENZA VACCINE, HIGH DOSE (65+ ONLY)    3. Wheelchair bound      4. Osteoarthritis, unspecified osteoarthritis type, unspecified site      5. Other chronic pain    - PARoxetine (PAXIL) 40 MG tablet; Take 1 Tab by mouth every day.  Dispense: 30 Tab; Refill: 3  - Patient has been identified as having a positive depression screening. Appropriate orders and counseling have been given.    6. Lymphedema      7. Benign essential HTN

## 2019-11-26 ENCOUNTER — OFFICE VISIT (OUTPATIENT)
Dept: MEDICAL GROUP | Facility: PHYSICIAN GROUP | Age: 74
End: 2019-11-26
Payer: MEDICARE

## 2019-11-26 VITALS
TEMPERATURE: 97.3 F | DIASTOLIC BLOOD PRESSURE: 80 MMHG | OXYGEN SATURATION: 96 % | BODY MASS INDEX: 27.32 KG/M2 | RESPIRATION RATE: 18 BRPM | HEART RATE: 74 BPM | HEIGHT: 66 IN | WEIGHT: 170 LBS | SYSTOLIC BLOOD PRESSURE: 138 MMHG

## 2019-11-26 DIAGNOSIS — F11.90 OPIATE USE: ICD-10-CM

## 2019-11-26 DIAGNOSIS — M19.90 OSTEOARTHRITIS, UNSPECIFIED OSTEOARTHRITIS TYPE, UNSPECIFIED SITE: ICD-10-CM

## 2019-11-26 DIAGNOSIS — G89.29 OTHER CHRONIC PAIN: ICD-10-CM

## 2019-11-26 DIAGNOSIS — Z99.3 WHEELCHAIR BOUND: ICD-10-CM

## 2019-11-26 PROCEDURE — 99214 OFFICE O/P EST MOD 30 MIN: CPT | Performed by: FAMILY MEDICINE

## 2019-11-26 NOTE — PROGRESS NOTES
Over 50% of this 25 minute visit was spent on counseling and coordination of care for the problem of  1. Osteoarthritis, unspecified osteoarthritis type, unspecified site    - REFERRAL TO COMPLEX CARE MANAGEMENT Services Requested: Care Manager to Evaluate and Recommend,     2. Other chronic pain    - REFERRAL TO COMPLEX CARE MANAGEMENT Services Requested: Care Manager to Evaluate and Recommend,     3. Wheelchair bound    - REFERRAL TO COMPLEX CARE MANAGEMENT Services Requested: Care Manager to Evaluate and Recommend,     4. Opiate use    - REFERRAL TO COMPLEX CARE MANAGEMENT Services Requested: Care Manager to Evaluate and Recommend,     Past Medical History:   Diagnosis Date   • Anxiety    • Chronic fatigue    • Depression    • DJD (degenerative joint disease)    • Encounter for long-term (current) use of other medications     surya   • Fibromyalgia    • Hyperlipidemia    • Hypertension    • Lymphedema    • Migraine    • Osteoarthritis     wc bound due to pain in back and hips   • Wheelchair bound      Past Surgical History:   Procedure Laterality Date   • CHOLECYSTECTOMY     • COLON RESECTION     • HERNIA REPAIR     • OTHER ABDOMINAL SURGERY     • OTHER ORTHOPEDIC SURGERY      neck   • TONSILLECTOMY       Social History     Tobacco Use   • Smoking status: Current Every Day Smoker     Packs/day: 0.25   • Smokeless tobacco: Never Used   • Tobacco comment: Smokes Native cigarettes 3 qd   Substance Use Topics   • Alcohol use: No   • Drug use: No     History reviewed. No pertinent family history.      Current Outpatient Medications:   •  clonazePAM (KLONOPIN) 0.5 MG Tab, TK 1 T PO  BID PRN, Disp: , Rfl: 2  •  gentamicin (GARAMYCIN) 0.1 % Ointment, JETHRO TO WOUND BID UNTIL HEALED. PLEASE CONTACT OFFICE FOR APPOINTMENT, Disp: , Rfl: 3  •  PARoxetine (PAXIL) 40 MG tablet, Take 1 Tab by mouth every day., Disp: 30 Tab, Rfl: 3  •  metoprolol SR (TOPROL XL) 25 MG TABLET SR  24 HR, TAKE 1 TABLET BY MOUTH EVERY DAY, Disp: 100 Tab, Rfl: 1  •  SUMAtriptan (IMITREX) 25 MG Tab tablet, TAKE 1 TO 4 TABLETS BY MOUTH DAILY AS NEEDED. MAX DOSE  MG DAILY, Disp: 20 Tab, Rfl: 0  •  topiramate (TOPAMAX) 50 MG tablet, TAKE 1 TABLET BY MOUTH THREE TIMES DAILY, Disp: 270 Tab, Rfl: 0  •  mupirocin (BACTROBAN) 2 % Ointment, Apply 1 Application to affected area(s) 2 times a day., Disp: 60 g, Rfl: 1  •  morphine ER (MS CONTIN) 15 MG Tab CR tablet, TK 1 T PO  Q 12 H FOR 30 DAYS, Disp: , Rfl: 0  •  amlodipine (NORVASC) 5 MG Tab, Take 1 Tab by mouth every day., Disp: 30 Tab, Rfl: 6  •  hydrALAZINE (APRESOLINE) 25 MG Tab, Take 25 mg by mouth 3 times a day., Disp: , Rfl:   •  alprazolam (XANAX) 0.5 MG Tab, Take 0.5 mg by mouth at bedtime as needed for Sleep., Disp: , Rfl:   •  busPIRone (BUSPAR) 15 MG tablet, Take 15 mg by mouth 2 times a day., Disp: , Rfl:   •  mirtazapine (REMERON) 30 MG Tab tablet, Take 30 mg by mouth every evening., Disp: , Rfl:   •  buPROPion (WELLBUTRIN) 100 MG TABS, Take 100 mg by mouth 2 times a day., Disp: , Rfl:   •  tolterodine ER (DETROL LA) 4 MG CP24, Take 4 mg by mouth every day., Disp: , Rfl:   •  hydrocodone/acetaminophen (NORCO)  MG TABS, Take 1-2 Tabs by mouth every 6 hours as needed., Disp: , Rfl:   •  pravastatin (PRAVACHOL) 20 MG TABS, Take 20 mg by mouth every evening., Disp: , Rfl:   •  MORPHINE SULFATE 30 MG PO TABS, Take 30 mg by mouth every four hours as needed for Mild Pain., Disp: , Rfl:   •  MELOXICAM 15 MG PO TABS, Take 15 mg by mouth every day., Disp: , Rfl:   •  PROMETHAZINE HCL 25 MG PO TABS, Take 25 mg by mouth every 6 hours as needed for Nausea/Vomiting., Disp: , Rfl:   •  DIAZEPAM 5 MG PO TABS, Take 5 mg by mouth every 6 hours as needed for Anxiety., Disp: , Rfl:   •  SERTRALINE  MG PO TABS, Take 100 mg by mouth every day., Disp: , Rfl:   •  doxycycline (VIBRAMYCIN) 100 MG Tab, TK 1 T PO BID, Disp: , Rfl: 0  •  furosemide (LASIX) 20 MG Tab,  TAKE 1 TABLET BY MOUTH EVERY MORNING OR AS DIRECTED (Patient not taking: Reported on 11/11/2019), Disp: 100 Tab, Rfl: 1  •  topiramate (TOPAMAX) 50 MG tablet, TAKE 1 TABLET BY MOUTH THREE TIMES DAILY (Patient not taking: Reported on 11/11/2019), Disp: 300 Tab, Rfl: 1  •  triamterene-hctz (MAXZIDE-25/DYAZIDE) 37.5-25 MG Tab, TAKE 1 TABLET BY MOUTH EVERY MORNING (Patient not taking: Reported on 11/11/2019), Disp: 90 Tab, Rfl: 0  •  amLODIPine (NORVASC) 5 MG Tab, TAKE 1 TABLET BY MOUTH EVERY DAY (Patient not taking: Reported on 11/11/2019), Disp: 90 Tab, Rfl: 3  •  potassium chloride SA (K-DUR) 10 MEQ Tab CR, TAKE 1 TABLET BY MOUTH TWICE DAILY (Patient not taking: Reported on 11/11/2019), Disp: 180 Each, Rfl: 0  •  folic acid (FOLVITE) 1 MG Tab, Take 1 Tab by mouth every day. (Patient not taking: Reported on 11/11/2019), Disp: 90 Tab, Rfl: 6  •  amoxicillin (AMOXIL) 500 MG Cap, Take 1 Cap by mouth 3 times a day. (Patient not taking: Reported on 11/11/2019), Disp: 30 Cap, Rfl: 0  •  cholecalciferol ([employer] VIT D 5000 HIGH-POTENCY) 5000 UNIT Cap, Take 1 Cap by mouth every day. (Patient not taking: Reported on 11/11/2019), Disp: 30 Cap, Rfl: 6  •  permethrin (ELIMITE) 5 % Cream, Apply to entire body from neck down, leave on for 8-14 hours before rinsing off (Patient not taking: Reported on 11/11/2019), Disp: 1 Tube, Rfl: 0  •  Incontinence Supply Disposable (ATTENDS UNDERWEAR 7 LARGE) Misc, Use as needed (Patient taking differently: Use as needed.   This rx goes to [facility] Disp: 432 Each, Rfl: 3  •  sulfamethoxazole-trimethoprim (BACTRIM DS) 800-160 MG tablet, Take 1 Tab by mouth 2 times a day. (Patient not taking: Reported on 11/11/2019), Disp: 20 Tab, Rfl: 0  •  fluconazole (DIFLUCAN) 100 MG Tab, Take 100 mg by mouth every day., Disp: , Rfl:     Patient was instructed on the use of medications, either prescriptions or OTC and informed on when the appropriate follow up time period should be. In addition, patient  was also instructed that should any acute worsening occur that they should notify this clinic asap or call 911.    Patient is a 72 y/o with chronic back pain and is mainly wc bound  She lives at home alone and has no family in the area to help her  She has 4 hours of home health aides per day to help with cooking and bathing but is alone the other 20 hours of the day  During this time period it is very hard for her to do any adl's at all   She is on large doses of opiates from the pain clinic for her chronic back pain issues and OA  I think that at this time she needs a higher level of care due to her disability and use of medications and need for help with her adl's   She has been previously hesitant to do this but is now willing to do so. Will have our sw speak with her regarding what her options are financially and go from there

## 2019-11-27 ENCOUNTER — PATIENT OUTREACH (OUTPATIENT)
Dept: MEDICAL GROUP | Facility: PHYSICIAN GROUP | Age: 74
End: 2019-11-27

## 2019-11-27 NOTE — PROGRESS NOTES
Referral received from PCP-MD, specifying social need of increased care services and/or HLC. TC to pt to engage, assess and establish care plan. Pt educated on role of SW and reason for referral.    Pt reports being on FFS Medicaid through [employer]. Pt receives 4 hours daily Medicaid-paid hygiene services and 4 hours weekly [employer]-paid homemaker services. [facility]. SW educated pt she may pursue group home through [employer] if intersted; pt has not discussed this option [facility] to [facility]. Pt gave  approval to contact [employer] SW to discuss recommendation by PCP for increased care hours as well as begin the conversation regarding [employer] Group Home Waiver.    Plan:  Pt added to SW caseload  TC to [employer] SW at later time/date  TC to pt at later time/date

## 2019-12-04 ENCOUNTER — PATIENT OUTREACH (OUTPATIENT)
Dept: MEDICAL GROUP | Facility: PHYSICIAN GROUP | Age: 74
End: 2019-12-04

## 2019-12-04 NOTE — PROGRESS NOTES
VM left for MATY NOVAK [facility] regarding pt's desire to be educated on the group home aspect of the community waiver program pt is currently established with. This [facility]    TC to pt. JEREMI(1) left informing of the above and requesting return TC.    Plan:  TC2 to pt at later time/date.

## 2019-12-06 ENCOUNTER — PATIENT OUTREACH (OUTPATIENT)
Dept: MEDICAL GROUP | Facility: PHYSICIAN GROUP | Age: 74
End: 2019-12-06

## 2019-12-07 NOTE — PROGRESS NOTES
TC from MATY NOVAK, KISHORE educated MATY NOVAK regarding PCP recommendation for increased hours of care of HLC as well as this KISHORE's conversation with pt regarding [facility] group home option. [facility] SW requesting documentation of recommendation by PCP. This SW recommends [employer] send faxed PAM requesting PCP appointment note dated 11/26/2019.     Plan:  TC to pt at later time/date.  Anticipate graduation d/c at next TC.

## 2019-12-10 ENCOUNTER — PATIENT OUTREACH (OUTPATIENT)
Dept: MEDICAL GROUP | Facility: PHYSICIAN GROUP | Age: 74
End: 2019-12-10

## 2019-12-10 NOTE — PROGRESS NOTES
TC to pt. Informed pt SW was able to make contact with MATY NOVAK. MATY NOVAK scheduled to visit pt 12/12. SW re-reviewed [facility] in-home services and group home services. Pt agreeable to graduation from PCP SW Services.    Plan:  Pt graduated from PCP SW Services  Please contact MATY Venegas (long term [facility] ) regarding additional social needs at [phone]

## 2020-02-27 DIAGNOSIS — M19.90 OSTEOARTHRITIS, UNSPECIFIED OSTEOARTHRITIS TYPE, UNSPECIFIED SITE: ICD-10-CM

## 2020-02-27 DIAGNOSIS — I89.0 LYMPHEDEMA: ICD-10-CM

## 2020-02-27 DIAGNOSIS — E78.2 MIXED HYPERLIPIDEMIA: ICD-10-CM

## 2020-02-27 DIAGNOSIS — Z12.31 ENCOUNTER FOR SCREENING MAMMOGRAM FOR HIGH-RISK PATIENT: ICD-10-CM

## 2020-02-27 DIAGNOSIS — Z00.00 WELL ADULT EXAM: ICD-10-CM

## 2020-02-27 DIAGNOSIS — Z78.0 POST-MENOPAUSAL: ICD-10-CM

## 2020-02-27 DIAGNOSIS — I10 BENIGN ESSENTIAL HTN: ICD-10-CM

## 2020-02-27 DIAGNOSIS — G43.009 MIGRAINE WITHOUT AURA AND WITHOUT STATUS MIGRAINOSUS, NOT INTRACTABLE: ICD-10-CM

## 2020-02-27 DIAGNOSIS — Z99.3 WHEELCHAIR BOUND: ICD-10-CM

## 2020-02-28 RX ORDER — TOPIRAMATE 50 MG/1
TABLET, FILM COATED ORAL
Qty: 300 TAB | Refills: 1 | Status: SHIPPED | OUTPATIENT
Start: 2020-02-28 | End: 2020-10-16 | Stop reason: SDUPTHER

## 2020-02-28 RX ORDER — FUROSEMIDE 20 MG/1
TABLET ORAL
Qty: 100 TAB | Refills: 1 | Status: SHIPPED | OUTPATIENT
Start: 2020-02-28

## 2020-02-28 RX ORDER — AMLODIPINE BESYLATE 5 MG/1
TABLET ORAL
Qty: 90 TAB | Refills: 3 | Status: SHIPPED | OUTPATIENT
Start: 2020-02-28

## 2020-10-16 DIAGNOSIS — Z78.0 POST-MENOPAUSAL: ICD-10-CM

## 2020-10-16 DIAGNOSIS — Z12.31 ENCOUNTER FOR SCREENING MAMMOGRAM FOR HIGH-RISK PATIENT: ICD-10-CM

## 2020-10-16 DIAGNOSIS — G43.009 MIGRAINE WITHOUT AURA AND WITHOUT STATUS MIGRAINOSUS, NOT INTRACTABLE: ICD-10-CM

## 2020-10-16 DIAGNOSIS — E78.2 MIXED HYPERLIPIDEMIA: ICD-10-CM

## 2020-10-16 DIAGNOSIS — I10 BENIGN ESSENTIAL HTN: ICD-10-CM

## 2020-10-16 DIAGNOSIS — Z99.3 WHEELCHAIR BOUND: ICD-10-CM

## 2020-10-16 DIAGNOSIS — Z00.00 WELL ADULT EXAM: ICD-10-CM

## 2020-10-16 DIAGNOSIS — I89.0 LYMPHEDEMA: ICD-10-CM

## 2020-10-16 DIAGNOSIS — M19.90 OSTEOARTHRITIS, UNSPECIFIED OSTEOARTHRITIS TYPE, UNSPECIFIED SITE: ICD-10-CM

## 2020-10-19 RX ORDER — TOPIRAMATE 50 MG/1
TABLET, FILM COATED ORAL
Qty: 300 TAB | Refills: 1 | Status: SHIPPED | OUTPATIENT
Start: 2020-10-19 | End: 2020-12-19

## 2020-10-19 RX ORDER — AMLODIPINE BESYLATE 5 MG/1
5 TABLET ORAL DAILY
Qty: 30 TAB | Refills: 6 | Status: SHIPPED | OUTPATIENT
Start: 2020-10-19

## 2025-07-15 ENCOUNTER — APPOINTMENT (OUTPATIENT)
Dept: ADMISSIONS | Facility: MEDICAL CENTER | Age: 80
End: 2025-07-15
Attending: PAIN MEDICINE
Payer: MEDICARE

## 2025-07-16 ENCOUNTER — PRE-ADMISSION TESTING (OUTPATIENT)
Dept: ADMISSIONS | Facility: MEDICAL CENTER | Age: 80
End: 2025-07-16
Attending: PAIN MEDICINE
Payer: MEDICARE

## 2025-07-24 ENCOUNTER — ANESTHESIA (OUTPATIENT)
Dept: SURGERY | Facility: MEDICAL CENTER | Age: 80
End: 2025-07-24
Payer: MEDICARE

## 2025-07-24 ENCOUNTER — ANESTHESIA EVENT (OUTPATIENT)
Dept: SURGERY | Facility: MEDICAL CENTER | Age: 80
End: 2025-07-24
Payer: MEDICARE

## 2025-07-24 ENCOUNTER — APPOINTMENT (OUTPATIENT)
Dept: RADIOLOGY | Facility: MEDICAL CENTER | Age: 80
End: 2025-07-24
Attending: PAIN MEDICINE
Payer: MEDICARE

## 2025-07-24 ENCOUNTER — HOSPITAL ENCOUNTER (OUTPATIENT)
Facility: MEDICAL CENTER | Age: 80
End: 2025-07-24
Attending: PAIN MEDICINE | Admitting: PAIN MEDICINE
Payer: MEDICARE

## 2025-07-24 PROCEDURE — 700101 HCHG RX REV CODE 250: Performed by: ANESTHESIOLOGY

## 2025-07-24 PROCEDURE — 700111 HCHG RX REV CODE 636 W/ 250 OVERRIDE (IP): Performed by: ANESTHESIOLOGY

## 2025-07-24 PROCEDURE — 700105 HCHG RX REV CODE 258: Performed by: PAIN MEDICINE

## 2025-07-24 RX ORDER — DEXAMETHASONE SODIUM PHOSPHATE 4 MG/ML
INJECTION, SOLUTION INTRA-ARTICULAR; INTRALESIONAL; INTRAMUSCULAR; INTRAVENOUS; SOFT TISSUE PRN
Status: DISCONTINUED | OUTPATIENT
Start: 2025-07-24 | End: 2025-07-24 | Stop reason: SURG

## 2025-07-24 RX ORDER — ONDANSETRON 2 MG/ML
INJECTION INTRAMUSCULAR; INTRAVENOUS PRN
Status: DISCONTINUED | OUTPATIENT
Start: 2025-07-24 | End: 2025-07-24 | Stop reason: SURG

## 2025-07-24 RX ORDER — LIDOCAINE HYDROCHLORIDE 20 MG/ML
INJECTION, SOLUTION EPIDURAL; INFILTRATION; INTRACAUDAL; PERINEURAL PRN
Status: DISCONTINUED | OUTPATIENT
Start: 2025-07-24 | End: 2025-07-24 | Stop reason: SURG

## 2025-07-24 RX ORDER — CEFAZOLIN SODIUM 1 G/3ML
INJECTION, POWDER, FOR SOLUTION INTRAMUSCULAR; INTRAVENOUS PRN
Status: DISCONTINUED | OUTPATIENT
Start: 2025-07-24 | End: 2025-07-24 | Stop reason: SURG

## 2025-07-24 RX ADMIN — SODIUM CHLORIDE, POTASSIUM CHLORIDE, SODIUM LACTATE AND CALCIUM CHLORIDE: 600; 310; 30; 20 INJECTION, SOLUTION INTRAVENOUS at 14:19

## 2025-07-24 RX ADMIN — CEFAZOLIN 2 G: 1 INJECTION, POWDER, FOR SOLUTION INTRAMUSCULAR; INTRAVENOUS at 14:19

## 2025-07-24 RX ADMIN — PROPOFOL 150 MG: 10 INJECTION, EMULSION INTRAVENOUS at 14:23

## 2025-07-24 RX ADMIN — LIDOCAINE HYDROCHLORIDE 100 MG: 20 INJECTION, SOLUTION EPIDURAL; INFILTRATION; INTRACAUDAL; PERINEURAL at 14:23

## 2025-07-24 RX ADMIN — DEXAMETHASONE SODIUM PHOSPHATE 8 MG: 4 INJECTION INTRA-ARTICULAR; INTRALESIONAL; INTRAMUSCULAR; INTRAVENOUS; SOFT TISSUE at 14:28

## 2025-07-24 RX ADMIN — ONDANSETRON 4 MG: 2 INJECTION INTRAMUSCULAR; INTRAVENOUS at 15:04

## 2025-07-24 ASSESSMENT — PAIN DESCRIPTION - PAIN TYPE
TYPE: SURGICAL PAIN
TYPE: CHRONIC PAIN
TYPE: SURGICAL PAIN
TYPE: SURGICAL PAIN

## 2025-07-24 ASSESSMENT — PAIN SCALES - GENERAL: PAIN_LEVEL: 5

## 2025-07-24 NOTE — OR NURSING
Patient allergies and NPO status verified, home medication reconciliation completed and belongings secured. Surgical site verified with patient. Patient verbalizes understanding of pain scale, expected course of stay and plan of care; patient and  from SNF state verbal understanding at this time.      Pt uses w/c and total assist. Heels floated in pre-op with swelling to ankles.

## 2025-07-24 NOTE — DISCHARGE INSTRUCTIONS
If any questions arise, call your provider.  If your provider is not available, please feel free to call the Surgical Center at [phone].    MEDICATIONS: Resume taking daily medication.  Take prescribed pain medication with food.  If no medication is prescribed, you may take non-aspirin pain medication if needed.  PAIN MEDICATION CAN BE VERY CONSTIPATING.  Take a stool softener or laxative such as senokot, pericolace, or milk of magnesia if needed.    Last pain medication given at 3:22pm, oxycodone 10mg.     What to Expect Post Anesthesia    Rest and take it easy for the first 24 hours.  A responsible adult is recommended to remain with you during that time.  It is normal to feel sleepy.  We encourage you to not do anything that requires balance, judgment or coordination.    FOR 24 HOURS DO NOT:  Drive, operate machinery or run household appliances.  Drink beer or alcoholic beverages.  Make important decisions or sign legal documents.    To avoid nausea, slowly advance diet as tolerated, avoiding spicy or greasy foods for the first day.  Add more substantial food to your diet according to your provider's instructions.  Babies can be fed formula or breast milk as soon as they are hungry.  INCREASE FLUIDS AND FIBER TO AVOID CONSTIPATION.    MILD FLU-LIKE SYMPTOMS ARE NORMAL.  YOU MAY EXPERIENCE GENERALIZED MUSCLE ACHES, THROAT IRRITATION, HEADACHE AND/OR SOME NAUSEA.      Post Operative Discharge Instructions    DIET:   Upon discharge from the hospital, you may resume your normal preoperative diet, unless specifically directed otherwise. Depending on how you are feeling and whether you have nausea or not, you may wish to stay with a bland diet for the first few days. However, you can advance this as quickly as you feel ready.    ACTIVITIES:   After discharge from the hospital, you may resume full routine activities; however, there should be no heavy lifting (greater than 20 pounds or a bag of groceries) and no  strenuous activities for at least 2 weeks. The duration may be longer, depending on your surgical procedure. Routine activities of daily living are acceptable. Activity level should be addressed at your post-op follow up appointment.      DRIVING:   You may drive whenever you are off pain medications and are able to perform the activities needed to drive, i.e., turning, bending, twisting, etc.    BATHING:   You may get the wound wet at any time after leaving the hospital. You may shower, but do not submerge in a bath for at least two weeks.     If you have wound dressings, they may come off after 48 hours.   If you have skin glue to the wound, this will fall off on its own, do not pick at it.   If you have Steri-Strips to the wound, these will fall off on their own, do not pick at them. You may trim the edges if needed.    BOWEL FUNCTION:   After surgery, it is not uncommon for patients to develop either frequent or loose stools after meals. This usually corrects itself after a few days, to a few weeks. If this occurs, do not worry; this will resolve on its own.    Constipation is much more common than loose stools. The cause is the combination of pain medication and decreased activity level and possibly the nature of the surgical procedure performed. If you feel this is occurring, you may use an over-the-counter treatment such as MiraLAX (or Milk of Magnesia, Ex-Lax, Senokot, etc.) until the problem has resolved. Drink plenty of water and try to wean off narcotic pain medications as soon as is comfortable for you.    PAIN MEDICATION:   You will be given a prescription for pain medication at discharge. Please take these as directed. It is important to remember not to take medications on an empty stomach as this may cause nausea.   You may also take over the counter acetaminophen and/or NSAIDS (ibuprofen, Aleve, Advil, Motrin) per the package instructions.   You may also use ice to the wound to decrease pain and  swelling. You may alternate 20 minutes on and 20 minutes off with the ice for the first 24-48 hours. Make sure you place a washcloth or towel between the ice pack and your skin.   Please note that narcotic pain medication cannot be refilled unless you are seen by a doctor. Make sure you call the office if you are running l;ow on medication or if the diose you have been prescribed is not working well for you.    CALL YOUR SURGEONS OFFICE IF YOU HAVE:    Fevers to more than 101F   Unusual chest or leg pain  Drainage or fluid from incision that may be foul smelling, increased tenderness or soreness at the wound or the wound edges are no longer together, redness or swelling at the incision site.   Do not hesitate to call with any other questions.

## 2025-07-24 NOTE — ANESTHESIA PROCEDURE NOTES
Airway    Date/Time: 7/24/2025 2:24 PM    Performed by: Ariane Garcia M.D.  Authorized by: Ariane Gracia M.D.    Location:  OR  Urgency:  Elective  Indications for Airway Management:  Anesthesia      Spontaneous Ventilation: absent    Sedation Level:  Deep  Preoxygenated: Yes    Mask Difficulty Assessment:  0 - not attempted  Final Airway Type:  Supraglottic airway  Final Supraglottic Airway:  Standard LMA    SGA Size:  3  Number of Attempts at Approach:  1

## 2025-07-24 NOTE — ANESTHESIA POSTPROCEDURE EVALUATION
Patient: Claudia Brumfield    Procedure Summary       Date: 07/24/25 Room / Location:  OR 03 / SURGERY [facility]    Anesthesia Start: 1419 Anesthesia Stop: 1510    Procedure: INTRATHECAL PAIN PUMP REVISION (Abdomen) Diagnosis: (DEGENERATIVE DISC DISEASE, LUMBAR)    Surgeons: Mc Alba M.D. Responsible Provider: Ariane Garcia M.D.    Anesthesia Type: general ASA Status: 2            Final Anesthesia Type: general  Last vitals  BP   Blood Pressure : (!) 176/86    Temp   36.4 °C (97.5 °F)    Pulse   64   Resp   16    SpO2   91 %      Anesthesia Post Evaluation    Patient location during evaluation: PACU  Patient participation: complete - patient participated  Level of consciousness: awake and alert  Pain score: 5    Airway patency: patent  Anesthetic complications: no  Cardiovascular status: hemodynamically stable  Respiratory status: acceptable  Hydration status: euvolemic    PONV: none          There were no known notable events for this encounter.     Nurse Pain Score: 5 (NPRS)

## 2025-07-24 NOTE — OR NURSING
1508 Patient arrived to PACU from OR via [facility] VSS on 6L mask, resp even and unlabored. Patient drowsy. ID band verified Dressing c/d/I.       1510 Took a few sips of water, tolerating po well. Offered juice/soda.      1557 Pulling 1100 volume on incentive spirometer, with goal volume of 2200. Weak effort.      1530 Called Shell, support person, with update.      1614 Meets criteria to transfer to stage 2, [facility].  Good condition for dc. Report called to Shelby KEITH.

## 2025-07-24 NOTE — OP REPORT
DATE OF SERVICE:  07/24/2025     NAME OF PROCEDURE:  Revision Medtronic intrathecal infusion pump.     PREPROCEDURAL DIAGNOSIS:  The patient with spinal cord infusion pump for pain   management, now with depleted battery, requiring revision.     POSTPROCEDURAL DIAGNOSIS:  The patient with spinal cord infusion pump for pain   management, now with depleted battery, requiring revision.     PROCEDURE PERFORMED BY:  Mc Alba MD     ANESTHESIA:  General.     ANESTHESIOLOGIST:  Ariane Garcia MD     DESCRIPTION OF PROCEDURE:  The patient was brought into the preop holding   area.  Her and her caregiver were there to discuss the procedure itself and   possible complications.  They understood and accepted the risks.  She was then   brought to the operating room.  General anesthesia was induced.  The right   lower quadrant was sterilely prepped and sterilely draped.  A total of 10 mL   was raised over the medial aspect of the pump.  A 10 cm longitudinal incision   was made over the medial aspect.  Dissection was performed to relieve the pump   from the pocket.  Electrocautery was used to staunch the bleeding.  Once the   pump was on the abdomen, a total of 0.5 mL of CSF was aspirated confirming   patency of the system.  The pump was then disconnected from the sutureless   connector and put on the table.  The drug was taken out of the old pump and   placed in the new pump.  The new pump was then connected to the sutureless   connector and again a 24-gauge Tuohy needle aspirated another 0.5 mL of CSF.    This was followed by implantation of the pump with a Tyrex mesh for antibiotic   treatment.  The pump was anchored with a single Ethibond suture.  Irrigation   with saline was performed.  The wound was closed using 3-0 Vicryl and staples   on the skin.  No changes to the patient's pump programming was made today.    She will be scheduled to follow up in our office tomorrow morning or  bayron.        ______________________________  MD RODNEY Moore    DD:  07/24/2025 15:11  DT:  07/24/2025 16:51    Job#:  [id number]

## 2025-07-24 NOTE — OR SURGEON
Immediate Post OP Note    * No Diagnosis Codes entered *  Depleted spinal narcotic infusion pump battery    * No Diagnosis Codes entered *  As above    Procedure(s):  INTRATHECAL PAIN PUMP REVISION - Wound Class: Clean    Surgeon(s):  Mc Alba M.D.    Anesthesiologist/Type of Anesthesia:  Anesthesiologist: Ariane Garcia M.D./General    Surgical Staff:  Circulator: Leon Brandon R.N.  Scrub Person: Jerod Ramos    Specimens removed if any:  * No specimens in log *    Estimated Blood Loss: Less than 10 cc    Findings: None    Complications: None        7/24/2025 3:05 PM Mc Alba M.D.

## 2025-07-24 NOTE — ANESTHESIA TIME REPORT
Anesthesia Start and Stop Event Times       Date Time Event    7/24/2025 1414 Ready for Procedure     1419 Anesthesia Start     1510 Anesthesia Stop          Responsible Staff  07/24/25      Name Role Begin End    Ariane Garcia M.D. Anesth 1419 1510          Overtime Reason:  no overtime (within assigned shift)    Comments:

## 2025-07-24 NOTE — OR NURSING
1625- Received report from PACU RN     1635- Arrived to Stage 2 via gurney. Patient denies pain and nausea. Vital signs stable on room air. Patient dressed. Patient's caregiver at bedside assisted with patient transfer to personal wheelchair.     1700- Discharge education provided to pt and pt family, both verbalized understanding. Peripheral IV removed.     1705- Discharged to care of family post uneventful stay in PACU 2. Assisted out to car in wheelchair. All personal belongings discharged with patient.

## 2025-07-24 NOTE — ANESTHESIA PREPROCEDURE EVALUATION
Case: [id number] Date/Time: 07/24/25 1445    Procedure: INTRATHECAL PAIN PUMP REVISION    Pre-op diagnosis: DEGENERATIVE DISC DISEASE, LUMBAR    Location: [facility] OR 03 / SURGERY [facility]    Surgeons: Mc Alba M.D.            Relevant Problems   CARDIAC   (positive) Benign essential HTN         (positive) Chronic hepatitis C without hepatic coma (HCC)      Other   (positive) Osteoarthritis       Physical Exam    Airway   Mallampati: I  TM distance: >3 FB  Neck ROM: full    Comments: edentulous     Cardiovascular - normal exam   Dental    Pulmonary Breath sounds clear to auscultation     Abdominal (+) obese     Neurological - normal exam                   Anesthesia Plan    ASA 2       Plan - general       Airway plan will be LMA          Induction: intravenous    Postoperative Plan: Postoperative administration of opioids is intended.    Pertinent diagnostic labs and testing reviewed    Informed Consent:    Anesthetic plan and risks discussed with patient.